# Patient Record
Sex: FEMALE | Race: WHITE | NOT HISPANIC OR LATINO | Employment: OTHER | URBAN - METROPOLITAN AREA
[De-identification: names, ages, dates, MRNs, and addresses within clinical notes are randomized per-mention and may not be internally consistent; named-entity substitution may affect disease eponyms.]

---

## 2017-12-15 ENCOUNTER — HOSPITAL ENCOUNTER (EMERGENCY)
Facility: HOSPITAL | Age: 82
Discharge: HOME | End: 2017-12-16
Attending: EMERGENCY MEDICINE | Admitting: EMERGENCY MEDICINE

## 2017-12-15 DIAGNOSIS — K59.00 CONSTIPATION, UNSPECIFIED CONSTIPATION TYPE: Primary | ICD-10-CM

## 2017-12-15 PROCEDURE — 99284 EMERGENCY DEPT VISIT MOD MDM: CPT

## 2017-12-15 RX ORDER — DOCUSATE SODIUM 100 MG/1
100 CAPSULE, LIQUID FILLED ORAL 2 TIMES DAILY PRN
COMMUNITY

## 2017-12-15 RX ORDER — SODIUM CHLORIDE 0.9 % (FLUSH) 0.9 %
10 SYRINGE (ML) INJECTION AS NEEDED
Status: DISCONTINUED | OUTPATIENT
Start: 2017-12-15 | End: 2017-12-16 | Stop reason: HOSPADM

## 2017-12-16 ENCOUNTER — APPOINTMENT (OUTPATIENT)
Dept: GENERAL RADIOLOGY | Facility: HOSPITAL | Age: 82
End: 2017-12-16

## 2017-12-16 VITALS
WEIGHT: 100 LBS | TEMPERATURE: 98 F | RESPIRATION RATE: 18 BRPM | HEIGHT: 60 IN | HEART RATE: 90 BPM | DIASTOLIC BLOOD PRESSURE: 44 MMHG | SYSTOLIC BLOOD PRESSURE: 95 MMHG | OXYGEN SATURATION: 94 % | BODY MASS INDEX: 19.63 KG/M2

## 2017-12-16 LAB
ALBUMIN SERPL-MCNC: 3.8 G/DL (ref 3.5–5.2)
ALBUMIN/GLOB SERPL: 1.4 G/DL
ALP SERPL-CCNC: 69 U/L (ref 39–117)
ALT SERPL W P-5'-P-CCNC: 11 U/L (ref 1–33)
ANION GAP SERPL CALCULATED.3IONS-SCNC: 10 MMOL/L
AST SERPL-CCNC: 13 U/L (ref 1–32)
BASOPHILS # BLD AUTO: 0.01 10*3/MM3 (ref 0–0.2)
BASOPHILS NFR BLD AUTO: 0.1 % (ref 0–1.5)
BILIRUB SERPL-MCNC: 0.3 MG/DL (ref 0.1–1.2)
BILIRUB UR QL STRIP: NEGATIVE
BUN BLD-MCNC: 19 MG/DL (ref 8–23)
BUN/CREAT SERPL: 21.1 (ref 7–25)
CALCIUM SPEC-SCNC: 9.8 MG/DL (ref 8.6–10.5)
CHLORIDE SERPL-SCNC: 100 MMOL/L (ref 98–107)
CLARITY UR: CLEAR
CO2 SERPL-SCNC: 34 MMOL/L (ref 22–29)
COLOR UR: ABNORMAL
CREAT BLD-MCNC: 0.9 MG/DL (ref 0.57–1)
DEPRECATED RDW RBC AUTO: 46.9 FL (ref 37–54)
EOSINOPHIL # BLD AUTO: 0.01 10*3/MM3 (ref 0–0.7)
EOSINOPHIL NFR BLD AUTO: 0.1 % (ref 0.3–6.2)
ERYTHROCYTE [DISTWIDTH] IN BLOOD BY AUTOMATED COUNT: 13 % (ref 11.7–13)
GFR SERPL CREATININE-BSD FRML MDRD: 60 ML/MIN/1.73
GLOBULIN UR ELPH-MCNC: 2.7 GM/DL
GLUCOSE BLD-MCNC: 122 MG/DL (ref 65–99)
GLUCOSE UR STRIP-MCNC: NEGATIVE MG/DL
HCT VFR BLD AUTO: 42.3 % (ref 35.6–45.5)
HGB BLD-MCNC: 13.5 G/DL (ref 11.9–15.5)
HGB UR QL STRIP.AUTO: NEGATIVE
IMM GRANULOCYTES # BLD: 0.04 10*3/MM3 (ref 0–0.03)
IMM GRANULOCYTES NFR BLD: 0.3 % (ref 0–0.5)
KETONES UR QL STRIP: NEGATIVE
LEUKOCYTE ESTERASE UR QL STRIP.AUTO: NEGATIVE
LIPASE SERPL-CCNC: 23 U/L (ref 13–60)
LYMPHOCYTES # BLD AUTO: 0.86 10*3/MM3 (ref 0.9–4.8)
LYMPHOCYTES NFR BLD AUTO: 7.5 % (ref 19.6–45.3)
MCH RBC QN AUTO: 31.5 PG (ref 26.9–32)
MCHC RBC AUTO-ENTMCNC: 31.9 G/DL (ref 32.4–36.3)
MCV RBC AUTO: 98.8 FL (ref 80.5–98.2)
MONOCYTES # BLD AUTO: 0.49 10*3/MM3 (ref 0.2–1.2)
MONOCYTES NFR BLD AUTO: 4.3 % (ref 5–12)
NEUTROPHILS # BLD AUTO: 10.02 10*3/MM3 (ref 1.9–8.1)
NEUTROPHILS NFR BLD AUTO: 87.7 % (ref 42.7–76)
NITRITE UR QL STRIP: NEGATIVE
PH UR STRIP.AUTO: 5.5 [PH] (ref 5–8)
PLATELET # BLD AUTO: 215 10*3/MM3 (ref 140–500)
PMV BLD AUTO: 10.3 FL (ref 6–12)
POTASSIUM BLD-SCNC: 3.7 MMOL/L (ref 3.5–5.2)
PROT SERPL-MCNC: 6.5 G/DL (ref 6–8.5)
PROT UR QL STRIP: NEGATIVE
RBC # BLD AUTO: 4.28 10*6/MM3 (ref 3.9–5.2)
SODIUM BLD-SCNC: 144 MMOL/L (ref 136–145)
SP GR UR STRIP: 1.02 (ref 1–1.03)
UROBILINOGEN UR QL STRIP: ABNORMAL
WBC NRBC COR # BLD: 11.43 10*3/MM3 (ref 4.5–10.7)

## 2017-12-16 PROCEDURE — 83690 ASSAY OF LIPASE: CPT | Performed by: PHYSICIAN ASSISTANT

## 2017-12-16 PROCEDURE — 81003 URINALYSIS AUTO W/O SCOPE: CPT | Performed by: PHYSICIAN ASSISTANT

## 2017-12-16 PROCEDURE — 85025 COMPLETE CBC W/AUTO DIFF WBC: CPT | Performed by: PHYSICIAN ASSISTANT

## 2017-12-16 PROCEDURE — 80053 COMPREHEN METABOLIC PANEL: CPT | Performed by: PHYSICIAN ASSISTANT

## 2017-12-16 PROCEDURE — 74022 RADEX COMPL AQT ABD SERIES: CPT

## 2017-12-16 RX ORDER — POLYETHYLENE GLYCOL 3350 17 G/17G
17 POWDER, FOR SOLUTION ORAL DAILY
Qty: 10 PACKET | Refills: 0 | Status: SHIPPED | OUTPATIENT
Start: 2017-12-16 | End: 2018-01-01

## 2017-12-16 RX ADMIN — SODIUM CHLORIDE 500 ML: 9 INJECTION, SOLUTION INTRAVENOUS at 01:14

## 2017-12-16 NOTE — ED NOTES
Reassurance given; call light in reach. Pts breathing even and unlabored. Pt appears in NAD at this time. Family at bedside.        Katie Denson RN  12/16/17 8567

## 2017-12-16 NOTE — DISCHARGE INSTRUCTIONS
Home, rest, home medicine as prescribed, keep well hydrated, follow up with PCP for recheck.  Return to care with further concerns.

## 2017-12-16 NOTE — ED PROVIDER NOTES
EMERGENCY DEPARTMENT ENCOUNTER    CHIEF COMPLAINT  Chief Complaint: Constipation  History given by: Patient  History limited by:   Room Number: 06/06  PMD: Héctor Abbott MD      HPI:  Pt is a 82 y.o. female who presents complaining of constipation. Pt has a hx of chronic constipation. Today, pt reported that she was having abd pain and nausea/vomiting. EMS was called and upon EMS arrival, pt had finished having a BM. Pt also has a hx of diverticulitis.    Duration: ongoing  Onset: gradual  Timing: constant  Quality: constipation  Intensity/Severity: moderate  Progression: improved  Associated Symptoms: abd pain, nausea, vomiting  Aggravating Factors: none  Alleviating Factors: BM  Previous Episodes: hx of chronic constipation  Treatment before arrival: none    PAST MEDICAL HISTORY  Active Ambulatory Problems     Diagnosis Date Noted   • Pancolitis 08/24/2016   • COPD (chronic obstructive pulmonary disease) 08/25/2016   • Hypertension 08/25/2016   • Sepsis 08/25/2016   • Acute bacterial colitis 08/26/2016     Resolved Ambulatory Problems     Diagnosis Date Noted   • No Resolved Ambulatory Problems     Past Medical History:   Diagnosis Date   • Anxiety    • Arthritis    • Cancer    • COPD (chronic obstructive pulmonary disease)    • Diverticulitis    • Hyperlipidemia    • Hypertension    • Osteoporosis        PAST SURGICAL HISTORY  Past Surgical History:   Procedure Laterality Date   • APPENDECTOMY     • BLADDER SURGERY      dr smith.   for bladder cancer   • COLON SURGERY      dr huang.  diverticulosis.   • HEMORRHOIDECTOMY      dr huang   • HYSTERECTOMY     • TONSILLECTOMY         FAMILY HISTORY  History reviewed. No pertinent family history.    SOCIAL HISTORY  Social History     Social History   • Marital status:      Spouse name: N/A   • Number of children: N/A   • Years of education: N/A     Occupational History   • Not on file.     Social History Main Topics   • Smoking status: Current Every  Day Smoker     Packs/day: 0.50     Years: 61.00     Types: Cigarettes   • Smokeless tobacco: Not on file   • Alcohol use No   • Drug use: No   • Sexual activity: Defer     Other Topics Concern   • Not on file     Social History Narrative       ALLERGIES  Adhesive tape and Latex    REVIEW OF SYSTEMS  Review of Systems   Constitutional: Negative for fever.   HENT: Negative for sore throat.    Eyes: Negative.    Respiratory: Negative for cough and shortness of breath.    Cardiovascular: Negative for chest pain.   Gastrointestinal: Positive for abdominal pain, constipation, nausea and vomiting. Negative for diarrhea.   Genitourinary: Negative for dysuria.   Musculoskeletal: Negative for neck pain.   Skin: Negative for rash.   Allergic/Immunologic: Negative.    Neurological: Negative for weakness, numbness and headaches.   Hematological: Negative.    Psychiatric/Behavioral: Negative.    All other systems reviewed and are negative.      PHYSICAL EXAM  ED Triage Vitals   Temp Heart Rate Resp BP SpO2   12/15/17 2220 12/15/17 2219 12/15/17 2219 12/15/17 2219 12/15/17 2219   97.3 °F (36.3 °C) 92 18 124/55 98 %      Temp src Heart Rate Source Patient Position BP Location FiO2 (%)   -- -- -- -- --              Physical Exam   Constitutional: She is oriented to person, place, and time and well-developed, well-nourished, and in no distress. No distress.   HENT:   Head: Normocephalic and atraumatic.   Eyes: EOM are normal. Pupils are equal, round, and reactive to light.   Neck: Normal range of motion. Neck supple.   Cardiovascular: Normal rate, regular rhythm and normal heart sounds.    Pulmonary/Chest: Effort normal and breath sounds normal. No respiratory distress.   Abdominal: Soft. Bowel sounds are normal. There is no tenderness. There is no rebound and no guarding.   Musculoskeletal: Normal range of motion. She exhibits no edema.   Neurological: She is alert and oriented to person, place, and time. She has normal sensation  and normal strength.   Skin: Skin is warm and dry. No rash noted.   Psychiatric: Mood and affect normal.   Nursing note and vitals reviewed.      LAB RESULTS  Lab Results (last 24 hours)     Procedure Component Value Units Date/Time    CBC & Differential [[id number]] Collected:  12/16/17 0112    Specimen:  Blood Updated:  12/16/17 0125    Narrative:       The following orders were created for panel order CBC & Differential.  Procedure                               Abnormality         Status                     ---------                               -----------         ------                     CBC Auto Differential[[id number]]        Abnormal            Final result                 Please view results for these tests on the individual orders.    Comprehensive Metabolic Panel [[id number]]  (Abnormal) Collected:  12/16/17 0112    Specimen:  Blood Updated:  12/16/17 0139     Glucose 122 (H) mg/dL      BUN 19 mg/dL      Creatinine 0.90 mg/dL      Sodium 144 mmol/L      Potassium 3.7 mmol/L      Chloride 100 mmol/L      CO2 34.0 (H) mmol/L      Calcium 9.8 mg/dL      Total Protein 6.5 g/dL      Albumin 3.80 g/dL      ALT (SGPT) 11 U/L      AST (SGOT) 13 U/L      Alkaline Phosphatase 69 U/L      Total Bilirubin 0.3 mg/dL      eGFR Non African Amer 60 (L) mL/min/1.73      Globulin 2.7 gm/dL      A/G Ratio 1.4 g/dL      BUN/Creatinine Ratio 21.1     Anion Gap 10.0 mmol/L     Narrative:       The MDRD GFR formula is only valid for adults with stable renal function between ages 18 and 70.    Lipase [[id number]]  (Normal) Collected:  12/16/17 0112    Specimen:  Blood Updated:  12/16/17 0139     Lipase 23 U/L     CBC Auto Differential [[id number]]  (Abnormal) Collected:  12/16/17 0112    Specimen:  Blood Updated:  12/16/17 0125     WBC 11.43 (H) 10*3/mm3      RBC 4.28 10*6/mm3      Hemoglobin 13.5 g/dL      Hematocrit 42.3 %      MCV 98.8 (H) fL      MCH 31.5 pg      MCHC 31.9 (L) g/dL      RDW 13.0 %      RDW-SD 46.9 fl      MPV  10.3 fL      Platelets 215 10*3/mm3      Neutrophil % 87.7 (H) %      Lymphocyte % 7.5 (L) %      Monocyte % 4.3 (L) %      Eosinophil % 0.1 (L) %      Basophil % 0.1 %      Immature Grans % 0.3 %      Neutrophils, Absolute 10.02 (H) 10*3/mm3      Lymphocytes, Absolute 0.86 (L) 10*3/mm3      Monocytes, Absolute 0.49 10*3/mm3      Eosinophils, Absolute 0.01 10*3/mm3      Basophils, Absolute 0.01 10*3/mm3      Immature Grans, Absolute 0.04 (H) 10*3/mm3     Urinalysis With / Culture If Indicated - Urine, Catheter [[id number]]  (Abnormal) Collected:  12/16/17 0123    Specimen:  Urine from Urine, Catheter Updated:  12/16/17 0150     Color, UA Dark Yellow (A)     Appearance, UA Clear     pH, UA 5.5     Specific Gravity, UA 1.020     Glucose, UA Negative     Ketones, UA Negative     Bilirubin, UA Negative     Blood, UA Negative     Protein, UA Negative     Leuk Esterase, UA Negative     Nitrite, UA Negative     Urobilinogen, UA 0.2 E.U./dL    Narrative:       Urine microscopic not indicated.          I ordered the above labs and reviewed the results    RADIOLOGY  XR Abdomen 2 View With Chest 1 View   Final Result   1. Nonspecific, nonobstructive bowel gas pattern.       This report was finalized on 12/16/2017 12:57 AM by Sherman Vera MD.               I ordered the above noted radiological studies. Interpreted by radiologist. Reviewed by me in PACS.       PROCEDURES  Procedures      PROGRESS AND CONSULTS  ED Course   11:10 PM  Ordered UA, blood work, and X-ray abd. IVF ordered for hydration.  12:09 AM  Reviewed pt's history and workup with Dr. Galindo.  After a bedside evaluation; Dr Galindo agrees with the plan of care  2:08 AM  Rechecked with pt. Informed pt of her negative labs and imaging. Discussed with pt about plan to discharge. Pt understands and agrees with plan. All concerns addressed.         MEDICAL DECISION MAKING  Results were reviewed/discussed with the patient and they were also made aware of online access.  Pt also made aware that some labs, such as cultures, will not be resulted during ER visit and follow up with PMD is necessary.     MDM  Number of Diagnoses or Management Options  Constipation, unspecified constipation type:      Amount and/or Complexity of Data Reviewed  Clinical lab tests: reviewed and ordered  Tests in the radiology section of CPT®: reviewed and ordered           DIAGNOSIS  Final diagnoses:   Constipation, unspecified constipation type       DISPOSITION  DISCHARGE    Patient discharged in stable condition.    Reviewed implications of results, diagnosis, meds, responsibility to follow up, warning signs and symptoms of possible worsening, potential complications and reasons to return to ER.    Patient/Family voiced understanding of above instructions.    Discussed plan for discharge, as there is no emergent indication for admission.  Pt/family is agreeable and understands need for follow up and repeat testing.  Pt is aware that discharge does not mean that nothing is wrong but it indicates no emergency is present that requires admission and they must continue care with follow-up as given below or physician of their choice.     FOLLOW-UP  Héctor Abbott MD  [address]  [address]  [place] [id number] [phone]    Schedule an appointment as soon as possible for a visit in 4 days           Medication List      New Prescriptions          polyethylene glycol packet   Commonly known as:  MIRALAX   Take 17 g by mouth Daily.         Stop          lactobacillus acidophilus capsule capsule       MELOXICAM PO       simvastatin 10 MG tablet   Commonly known as:  ZOCOR       TRAMADOL HCL ER PO               Latest Documented Vital Signs:  As of 2:46 AM  BP- 95/44 HR- 90 Temp- 98 °F (36.7 °C) (Tympanic) O2 sat- 94%    --  Documentation assistance provided by cooper Matamoros for Jose L Ocampo PA-C.  Information recorded by the cooper was done at my direction and has been verified and validated by  me.     Miguel Ángel Matamoros  12/16/17 0053       Miguel Ángel Matamoros  12/16/17 0210       YANCY Devine  12/16/17 0247

## 2017-12-16 NOTE — ED PROVIDER NOTES
Discussed pt's case with Terrence MENDEZ.   PT presents to ER with family present at bedside. Family states that pt has been constipated. Family also c/o generalized weakness, mildly decreased appetite, and recent weight loss. Family states that pt does take magnesium citrate for relief. discussed plan to perform  labs to see if pt is dehydrated. Discussed the possibility of placing pt on MiraLax. Recommend pt take B vitamin supplements to help stimulate pt's appetite.   On exam, pt is initially sleeping, but wakes easily to physical stimuli. Pt is resting comfortably in bed and in no acute distress. Abdomen is benign, no localized tenderness. There is no guarding/ rebound/ or mass.    I supervised care provided by the midlevel provider.    We have discussed this patient's history, physical exam, and treatment plan.   I have reviewed the note and personally saw and examined the patient and agree with the plan of care.    Documentation assistance provided by cooper Vergara for Dr. Galindo.  Information recorded by the scribe was done at my direction and has been verified and validated by me.       Scooby Vergara  12/16/17 0010       Vinicius Galindo MD  12/16/17 0601

## 2017-12-20 ENCOUNTER — TELEPHONE (OUTPATIENT)
Dept: SOCIAL WORK | Facility: HOSPITAL | Age: 82
End: 2017-12-20

## 2018-01-01 ENCOUNTER — APPOINTMENT (OUTPATIENT)
Dept: GENERAL RADIOLOGY | Facility: HOSPITAL | Age: 83
End: 2018-01-01

## 2018-01-01 ENCOUNTER — APPOINTMENT (OUTPATIENT)
Dept: CT IMAGING | Facility: HOSPITAL | Age: 83
End: 2018-01-01

## 2018-01-01 ENCOUNTER — HOSPITAL ENCOUNTER (INPATIENT)
Facility: HOSPITAL | Age: 83
LOS: 3 days | Discharge: NURSING, REHAB OR LONG-TERM CARE | End: 2018-07-13
Attending: EMERGENCY MEDICINE | Admitting: INTERNAL MEDICINE

## 2018-01-01 ENCOUNTER — HOSPITAL ENCOUNTER (OUTPATIENT)
Facility: HOSPITAL | Age: 83
Setting detail: OBSERVATION
Discharge: HOME | End: 2018-04-28
Attending: EMERGENCY MEDICINE | Admitting: INTERNAL MEDICINE

## 2018-01-01 VITALS
HEIGHT: 60 IN | TEMPERATURE: 97.9 F | HEART RATE: 81 BPM | DIASTOLIC BLOOD PRESSURE: 60 MMHG | OXYGEN SATURATION: 95 % | BODY MASS INDEX: 20.75 KG/M2 | SYSTOLIC BLOOD PRESSURE: 109 MMHG | WEIGHT: 105.7 LBS | RESPIRATION RATE: 20 BRPM

## 2018-01-01 VITALS
BODY MASS INDEX: 20.93 KG/M2 | HEART RATE: 80 BPM | HEIGHT: 60 IN | WEIGHT: 106.6 LBS | DIASTOLIC BLOOD PRESSURE: 78 MMHG | RESPIRATION RATE: 20 BRPM | SYSTOLIC BLOOD PRESSURE: 143 MMHG | OXYGEN SATURATION: 100 % | TEMPERATURE: 97.8 F

## 2018-01-01 DIAGNOSIS — R53.1 GENERALIZED WEAKNESS: ICD-10-CM

## 2018-01-01 DIAGNOSIS — J44.1 COPD EXACERBATION (HCC): Primary | ICD-10-CM
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
## 2018-01-01 DIAGNOSIS — S22.32XA CLOSED FRACTURE OF ONE RIB OF LEFT SIDE, INITIAL ENCOUNTER: ICD-10-CM

## 2018-01-01 DIAGNOSIS — L02.91 ABSCESS: ICD-10-CM

## 2018-01-01 DIAGNOSIS — K57.92 ACUTE DIVERTICULITIS: ICD-10-CM

## 2018-01-01 DIAGNOSIS — I95.9 HYPOTENSION, UNSPECIFIED HYPOTENSION TYPE: ICD-10-CM

## 2018-01-01 LAB
ALBUMIN SERPL-MCNC: 3.5 G/DL (ref 3.5–5.2)
ALBUMIN SERPL-MCNC: 3.7 G/DL (ref 3.5–5.2)
ALBUMIN SERPL-MCNC: 3.9 G/DL (ref 3.5–5.2)
ALBUMIN/GLOB SERPL: 1.4 G/DL
ALBUMIN/GLOB SERPL: 1.5 G/DL
ALBUMIN/GLOB SERPL: 1.5 G/DL
ALP SERPL-CCNC: 72 U/L (ref 39–117)
ALP SERPL-CCNC: 74 U/L (ref 39–117)
ALP SERPL-CCNC: 81 U/L (ref 39–117)
ALT SERPL W P-5'-P-CCNC: 6 U/L (ref 1–33)
ALT SERPL W P-5'-P-CCNC: 8 U/L (ref 1–33)
ALT SERPL W P-5'-P-CCNC: 8 U/L (ref 1–33)
ANION GAP SERPL CALCULATED.3IONS-SCNC: 10.4 MMOL/L
ANION GAP SERPL CALCULATED.3IONS-SCNC: 11.1 MMOL/L
ANION GAP SERPL CALCULATED.3IONS-SCNC: 12.1 MMOL/L
ANION GAP SERPL CALCULATED.3IONS-SCNC: 12.2 MMOL/L
ANION GAP SERPL CALCULATED.3IONS-SCNC: 8 MMOL/L
ANION GAP SERPL CALCULATED.3IONS-SCNC: 8.1 MMOL/L
ARTERIAL PATENCY WRIST A: ABNORMAL
ARTERIAL PATENCY WRIST A: ABNORMAL
AST SERPL-CCNC: 10 U/L (ref 1–32)
AST SERPL-CCNC: 12 U/L (ref 1–32)
AST SERPL-CCNC: 14 U/L (ref 1–32)
ATMOSPHERIC PRESS: 750.6 MMHG
ATMOSPHERIC PRESS: 752 MMHG
B PERT DNA SPEC QL NAA+PROBE: NOT DETECTED
BACTERIA UR QL AUTO: ABNORMAL /HPF
BASE EXCESS BLDA CALC-SCNC: 10.2 MMOL/L (ref 0–2)
BASE EXCESS BLDA CALC-SCNC: 5.8 MMOL/L (ref 0–2)
BASOPHILS # BLD AUTO: 0 10*3/MM3 (ref 0–0.2)
BASOPHILS # BLD AUTO: 0.01 10*3/MM3 (ref 0–0.2)
BASOPHILS # BLD AUTO: 0.01 10*3/MM3 (ref 0–0.2)
BASOPHILS # BLD AUTO: 0.02 10*3/MM3 (ref 0–0.2)
BASOPHILS NFR BLD AUTO: 0 % (ref 0–1.5)
BASOPHILS NFR BLD AUTO: 0.1 % (ref 0–1.5)
BDY SITE: ABNORMAL
BDY SITE: ABNORMAL
BILIRUB SERPL-MCNC: 0.3 MG/DL (ref 0.1–1.2)
BILIRUB SERPL-MCNC: 0.5 MG/DL (ref 0.1–1.2)
BILIRUB SERPL-MCNC: 0.6 MG/DL (ref 0.1–1.2)
BILIRUB UR QL STRIP: NEGATIVE
BUN BLD-MCNC: 12 MG/DL (ref 8–23)
BUN BLD-MCNC: 15 MG/DL (ref 8–23)
BUN BLD-MCNC: 16 MG/DL (ref 8–23)
BUN BLD-MCNC: 18 MG/DL (ref 8–23)
BUN BLD-MCNC: 18 MG/DL (ref 8–23)
BUN BLD-MCNC: 21 MG/DL (ref 8–23)
BUN/CREAT SERPL: 19.7 (ref 7–25)
BUN/CREAT SERPL: 20.3 (ref 7–25)
BUN/CREAT SERPL: 21.4 (ref 7–25)
BUN/CREAT SERPL: 26.9 (ref 7–25)
BUN/CREAT SERPL: 26.9 (ref 7–25)
BUN/CREAT SERPL: 28.4 (ref 7–25)
C PNEUM DNA NPH QL NAA+NON-PROBE: NOT DETECTED
CALCIUM SPEC-SCNC: 10 MG/DL (ref 8.6–10.5)
CALCIUM SPEC-SCNC: 8.1 MG/DL (ref 8.6–10.5)
CALCIUM SPEC-SCNC: 8.3 MG/DL (ref 8.6–10.5)
CALCIUM SPEC-SCNC: 8.4 MG/DL (ref 8.6–10.5)
CALCIUM SPEC-SCNC: 9.4 MG/DL (ref 8.6–10.5)
CALCIUM SPEC-SCNC: 9.8 MG/DL (ref 8.6–10.5)
CHLORIDE SERPL-SCNC: 100 MMOL/L (ref 98–107)
CHLORIDE SERPL-SCNC: 101 MMOL/L (ref 98–107)
CHLORIDE SERPL-SCNC: 104 MMOL/L (ref 98–107)
CHLORIDE SERPL-SCNC: 104 MMOL/L (ref 98–107)
CHLORIDE SERPL-SCNC: 110 MMOL/L (ref 98–107)
CHLORIDE SERPL-SCNC: 111 MMOL/L (ref 98–107)
CLARITY UR: CLEAR
CO2 SERPL-SCNC: 21.9 MMOL/L (ref 22–29)
CO2 SERPL-SCNC: 24.9 MMOL/L (ref 22–29)
CO2 SERPL-SCNC: 25.8 MMOL/L (ref 22–29)
CO2 SERPL-SCNC: 29.6 MMOL/L (ref 22–29)
CO2 SERPL-SCNC: 31.9 MMOL/L (ref 22–29)
CO2 SERPL-SCNC: 36 MMOL/L (ref 22–29)
COLOR UR: YELLOW
CREAT BLD-MCNC: 0.56 MG/DL (ref 0.57–1)
CREAT BLD-MCNC: 0.67 MG/DL (ref 0.57–1)
CREAT BLD-MCNC: 0.67 MG/DL (ref 0.57–1)
CREAT BLD-MCNC: 0.74 MG/DL (ref 0.57–1)
CREAT BLD-MCNC: 0.76 MG/DL (ref 0.57–1)
CREAT BLD-MCNC: 0.79 MG/DL (ref 0.57–1)
DEPRECATED RDW RBC AUTO: 43.3 FL (ref 37–54)
DEPRECATED RDW RBC AUTO: 45 FL (ref 37–54)
DEPRECATED RDW RBC AUTO: 45.5 FL (ref 37–54)
DEPRECATED RDW RBC AUTO: 45.5 FL (ref 37–54)
DEPRECATED RDW RBC AUTO: 46.2 FL (ref 37–54)
DEPRECATED RDW RBC AUTO: 46.3 FL (ref 37–54)
EOSINOPHIL # BLD AUTO: 0 10*3/MM3 (ref 0–0.7)
EOSINOPHIL # BLD AUTO: 0.05 10*3/MM3 (ref 0–0.7)
EOSINOPHIL # BLD AUTO: 0.06 10*3/MM3 (ref 0–0.7)
EOSINOPHIL # BLD AUTO: 0.19 10*3/MM3 (ref 0–0.7)
EOSINOPHIL NFR BLD AUTO: 0 % (ref 0.3–6.2)
EOSINOPHIL NFR BLD AUTO: 0.4 % (ref 0.3–6.2)
EOSINOPHIL NFR BLD AUTO: 0.6 % (ref 0.3–6.2)
EOSINOPHIL NFR BLD AUTO: 2.3 % (ref 0.3–6.2)
ERYTHROCYTE [DISTWIDTH] IN BLOOD BY AUTOMATED COUNT: 12.2 % (ref 11.7–13)
ERYTHROCYTE [DISTWIDTH] IN BLOOD BY AUTOMATED COUNT: 12.5 % (ref 11.7–13)
ERYTHROCYTE [DISTWIDTH] IN BLOOD BY AUTOMATED COUNT: 12.5 % (ref 11.7–13)
ERYTHROCYTE [DISTWIDTH] IN BLOOD BY AUTOMATED COUNT: 12.7 % (ref 11.7–13)
ERYTHROCYTE [DISTWIDTH] IN BLOOD BY AUTOMATED COUNT: 12.9 % (ref 11.7–13)
ERYTHROCYTE [DISTWIDTH] IN BLOOD BY AUTOMATED COUNT: 13 % (ref 11.7–13)
FLUAV H1 2009 PAND RNA NPH QL NAA+PROBE: NOT DETECTED
FLUAV H1 HA GENE NPH QL NAA+PROBE: NOT DETECTED
FLUAV H3 RNA NPH QL NAA+PROBE: NOT DETECTED
FLUAV SUBTYP SPEC NAA+PROBE: NOT DETECTED
FLUBV RNA ISLT QL NAA+PROBE: NOT DETECTED
GAS FLOW AIRWAY: 2 LPM
GAS FLOW AIRWAY: 2 LPM
GFR SERPL CREATININE-BSD FRML MDRD: 104 ML/MIN/1.73
GFR SERPL CREATININE-BSD FRML MDRD: 70 ML/MIN/1.73
GFR SERPL CREATININE-BSD FRML MDRD: 73 ML/MIN/1.73
GFR SERPL CREATININE-BSD FRML MDRD: 75 ML/MIN/1.73
GFR SERPL CREATININE-BSD FRML MDRD: 84 ML/MIN/1.73
GFR SERPL CREATININE-BSD FRML MDRD: 84 ML/MIN/1.73
GLOBULIN UR ELPH-MCNC: 2.4 GM/DL
GLOBULIN UR ELPH-MCNC: 2.4 GM/DL
GLOBULIN UR ELPH-MCNC: 2.7 GM/DL
GLUCOSE BLD-MCNC: 104 MG/DL (ref 65–99)
GLUCOSE BLD-MCNC: 108 MG/DL (ref 65–99)
GLUCOSE BLD-MCNC: 138 MG/DL (ref 65–99)
GLUCOSE BLD-MCNC: 166 MG/DL (ref 65–99)
GLUCOSE BLD-MCNC: 87 MG/DL (ref 65–99)
GLUCOSE BLD-MCNC: 87 MG/DL (ref 65–99)
GLUCOSE UR STRIP-MCNC: NEGATIVE MG/DL
HADV DNA SPEC NAA+PROBE: NOT DETECTED
HCO3 BLDA-SCNC: 31 MMOL/L (ref 22–28)
HCO3 BLDA-SCNC: 37 MMOL/L (ref 22–28)
HCOV 229E RNA SPEC QL NAA+PROBE: NOT DETECTED
HCOV HKU1 RNA SPEC QL NAA+PROBE: NOT DETECTED
HCOV NL63 RNA SPEC QL NAA+PROBE: NOT DETECTED
HCOV OC43 RNA SPEC QL NAA+PROBE: NOT DETECTED
HCT VFR BLD AUTO: 33.4 % (ref 35.6–45.5)
HCT VFR BLD AUTO: 33.7 % (ref 35.6–45.5)
HCT VFR BLD AUTO: 34.4 % (ref 35.6–45.5)
HCT VFR BLD AUTO: 37.7 % (ref 35.6–45.5)
HCT VFR BLD AUTO: 40.1 % (ref 35.6–45.5)
HCT VFR BLD AUTO: 42.6 % (ref 35.6–45.5)
HGB BLD-MCNC: 10.5 G/DL (ref 11.9–15.5)
HGB BLD-MCNC: 10.9 G/DL (ref 11.9–15.5)
HGB BLD-MCNC: 11 G/DL (ref 11.9–15.5)
HGB BLD-MCNC: 12 G/DL (ref 11.9–15.5)
HGB BLD-MCNC: 12.7 G/DL (ref 11.9–15.5)
HGB BLD-MCNC: 13.5 G/DL (ref 11.9–15.5)
HGB UR QL STRIP.AUTO: NEGATIVE
HMPV RNA NPH QL NAA+NON-PROBE: NOT DETECTED
HOLD SPECIMEN: NORMAL
HOLD SPECIMEN: NORMAL
HPIV1 RNA SPEC QL NAA+PROBE: NOT DETECTED
HPIV2 RNA SPEC QL NAA+PROBE: NOT DETECTED
HPIV3 RNA NPH QL NAA+PROBE: NOT DETECTED
HPIV4 P GENE NPH QL NAA+PROBE: NOT DETECTED
HYALINE CASTS UR QL AUTO: ABNORMAL /LPF
IMM GRANULOCYTES # BLD: 0 10*3/MM3 (ref 0–0.03)
IMM GRANULOCYTES # BLD: 0 10*3/MM3 (ref 0–0.03)
IMM GRANULOCYTES # BLD: 0.01 10*3/MM3 (ref 0–0.03)
IMM GRANULOCYTES # BLD: 0.03 10*3/MM3 (ref 0–0.03)
IMM GRANULOCYTES # BLD: 0.03 10*3/MM3 (ref 0–0.03)
IMM GRANULOCYTES # BLD: 0.04 10*3/MM3 (ref 0–0.03)
IMM GRANULOCYTES NFR BLD: 0 % (ref 0–0.5)
IMM GRANULOCYTES NFR BLD: 0 % (ref 0–0.5)
IMM GRANULOCYTES NFR BLD: 0.1 % (ref 0–0.5)
IMM GRANULOCYTES NFR BLD: 0.2 % (ref 0–0.5)
IMM GRANULOCYTES NFR BLD: 0.3 % (ref 0–0.5)
IMM GRANULOCYTES NFR BLD: 0.3 % (ref 0–0.5)
INR PPP: 0.95 (ref 0.9–1.1)
KETONES UR QL STRIP: NEGATIVE
LEUKOCYTE ESTERASE UR QL STRIP.AUTO: ABNORMAL
LIPASE SERPL-CCNC: 14 U/L (ref 13–60)
LYMPHOCYTES # BLD AUTO: 0.36 10*3/MM3 (ref 0.9–4.8)
LYMPHOCYTES # BLD AUTO: 0.51 10*3/MM3 (ref 0.9–4.8)
LYMPHOCYTES # BLD AUTO: 1.03 10*3/MM3 (ref 0.9–4.8)
LYMPHOCYTES # BLD AUTO: 1.19 10*3/MM3 (ref 0.9–4.8)
LYMPHOCYTES # BLD AUTO: 1.2 10*3/MM3 (ref 0.9–4.8)
LYMPHOCYTES # BLD AUTO: 1.59 10*3/MM3 (ref 0.9–4.8)
LYMPHOCYTES NFR BLD AUTO: 14.7 % (ref 19.6–45.3)
LYMPHOCYTES NFR BLD AUTO: 20.4 % (ref 19.6–45.3)
LYMPHOCYTES NFR BLD AUTO: 4.6 % (ref 19.6–45.3)
LYMPHOCYTES NFR BLD AUTO: 6.9 % (ref 19.6–45.3)
LYMPHOCYTES NFR BLD AUTO: 8.4 % (ref 19.6–45.3)
LYMPHOCYTES NFR BLD AUTO: 8.7 % (ref 19.6–45.3)
M PNEUMO IGG SER IA-ACNC: NOT DETECTED
MAGNESIUM SERPL-MCNC: 2.1 MG/DL (ref 1.6–2.4)
MCH RBC QN AUTO: 31.2 PG (ref 26.9–32)
MCH RBC QN AUTO: 31.4 PG (ref 26.9–32)
MCH RBC QN AUTO: 31.7 PG (ref 26.9–32)
MCH RBC QN AUTO: 31.7 PG (ref 26.9–32)
MCHC RBC AUTO-ENTMCNC: 31.4 G/DL (ref 32.4–36.3)
MCHC RBC AUTO-ENTMCNC: 31.7 G/DL (ref 32.4–36.3)
MCHC RBC AUTO-ENTMCNC: 31.7 G/DL (ref 32.4–36.3)
MCHC RBC AUTO-ENTMCNC: 31.8 G/DL (ref 32.4–36.3)
MCHC RBC AUTO-ENTMCNC: 32 G/DL (ref 32.4–36.3)
MCHC RBC AUTO-ENTMCNC: 32.3 G/DL (ref 32.4–36.3)
MCV RBC AUTO: 100 FL (ref 80.5–98.2)
MCV RBC AUTO: 97.5 FL (ref 80.5–98.2)
MCV RBC AUTO: 97.9 FL (ref 80.5–98.2)
MCV RBC AUTO: 98 FL (ref 80.5–98.2)
MCV RBC AUTO: 99 FL (ref 80.5–98.2)
MCV RBC AUTO: 99.1 FL (ref 80.5–98.2)
MODALITY: ABNORMAL
MODALITY: ABNORMAL
MONOCYTES # BLD AUTO: 0.12 10*3/MM3 (ref 0.2–1.2)
MONOCYTES # BLD AUTO: 0.24 10*3/MM3 (ref 0.2–1.2)
MONOCYTES # BLD AUTO: 0.69 10*3/MM3 (ref 0.2–1.2)
MONOCYTES # BLD AUTO: 0.82 10*3/MM3 (ref 0.2–1.2)
MONOCYTES # BLD AUTO: 0.92 10*3/MM3 (ref 0.2–1.2)
MONOCYTES # BLD AUTO: 1.11 10*3/MM3 (ref 0.2–1.2)
MONOCYTES NFR BLD AUTO: 11.3 % (ref 5–12)
MONOCYTES NFR BLD AUTO: 2.2 % (ref 5–12)
MONOCYTES NFR BLD AUTO: 2.3 % (ref 5–12)
MONOCYTES NFR BLD AUTO: 6.9 % (ref 5–12)
MONOCYTES NFR BLD AUTO: 7.8 % (ref 5–12)
MONOCYTES NFR BLD AUTO: 8.9 % (ref 5–12)
NEUTROPHILS # BLD AUTO: 10.24 10*3/MM3 (ref 1.9–8.1)
NEUTROPHILS # BLD AUTO: 11.9 10*3/MM3 (ref 1.9–8.1)
NEUTROPHILS # BLD AUTO: 4.7 10*3/MM3 (ref 1.9–8.1)
NEUTROPHILS # BLD AUTO: 5.44 10*3/MM3 (ref 1.9–8.1)
NEUTROPHILS # BLD AUTO: 5.8 10*3/MM3 (ref 1.9–8.1)
NEUTROPHILS # BLD AUTO: 9.98 10*3/MM3 (ref 1.9–8.1)
NEUTROPHILS NFR BLD AUTO: 70 % (ref 42.7–76)
NEUTROPHILS NFR BLD AUTO: 71.6 % (ref 42.7–76)
NEUTROPHILS NFR BLD AUTO: 83.1 % (ref 42.7–76)
NEUTROPHILS NFR BLD AUTO: 84.1 % (ref 42.7–76)
NEUTROPHILS NFR BLD AUTO: 90.8 % (ref 42.7–76)
NEUTROPHILS NFR BLD AUTO: 93.2 % (ref 42.7–76)
NITRITE UR QL STRIP: NEGATIVE
NT-PROBNP SERPL-MCNC: 328.9 PG/ML (ref 0–1800)
PCO2 BLDA: 46.6 MM HG (ref 35–45)
PCO2 BLDA: 58.2 MM HG (ref 35–45)
PH BLDA: 7.41 PH UNITS (ref 7.35–7.45)
PH BLDA: 7.43 PH UNITS (ref 7.35–7.45)
PH UR STRIP.AUTO: 6.5 [PH] (ref 5–8)
PLATELET # BLD AUTO: 191 10*3/MM3 (ref 140–500)
PLATELET # BLD AUTO: 196 10*3/MM3 (ref 140–500)
PLATELET # BLD AUTO: 200 10*3/MM3 (ref 140–500)
PLATELET # BLD AUTO: 215 10*3/MM3 (ref 140–500)
PLATELET # BLD AUTO: 241 10*3/MM3 (ref 140–500)
PLATELET # BLD AUTO: 257 10*3/MM3 (ref 140–500)
PMV BLD AUTO: 10.5 FL (ref 6–12)
PMV BLD AUTO: 10.6 FL (ref 6–12)
PMV BLD AUTO: 10.6 FL (ref 6–12)
PMV BLD AUTO: 10.7 FL (ref 6–12)
PMV BLD AUTO: 10.8 FL (ref 6–12)
PMV BLD AUTO: 11 FL (ref 6–12)
PO2 BLDA: 136.3 MM HG (ref 80–100)
PO2 BLDA: 94.9 MM HG (ref 80–100)
POTASSIUM BLD-SCNC: 4 MMOL/L (ref 3.5–5.2)
POTASSIUM BLD-SCNC: 4.1 MMOL/L (ref 3.5–5.2)
POTASSIUM BLD-SCNC: 4.2 MMOL/L (ref 3.5–5.2)
POTASSIUM BLD-SCNC: 4.2 MMOL/L (ref 3.5–5.2)
POTASSIUM BLD-SCNC: 4.4 MMOL/L (ref 3.5–5.2)
POTASSIUM BLD-SCNC: 4.6 MMOL/L (ref 3.5–5.2)
PROCALCITONIN SERPL-MCNC: 0.03 NG/ML (ref 0.1–0.25)
PROT SERPL-MCNC: 5.9 G/DL (ref 6–8.5)
PROT SERPL-MCNC: 6.1 G/DL (ref 6–8.5)
PROT SERPL-MCNC: 6.6 G/DL (ref 6–8.5)
PROT UR QL STRIP: ABNORMAL
PROTHROMBIN TIME: 12.5 SECONDS (ref 11.7–14.2)
RBC # BLD AUTO: 3.37 10*6/MM3 (ref 3.9–5.2)
RBC # BLD AUTO: 3.44 10*6/MM3 (ref 3.9–5.2)
RBC # BLD AUTO: 3.53 10*6/MM3 (ref 3.9–5.2)
RBC # BLD AUTO: 3.85 10*6/MM3 (ref 3.9–5.2)
RBC # BLD AUTO: 4.05 10*6/MM3 (ref 3.9–5.2)
RBC # BLD AUTO: 4.26 10*6/MM3 (ref 3.9–5.2)
RBC # UR: ABNORMAL /HPF
REF LAB TEST METHOD: ABNORMAL
RHINOVIRUS RNA SPEC NAA+PROBE: NOT DETECTED
RSV RNA NPH QL NAA+NON-PROBE: NOT DETECTED
SAO2 % BLDCOA: 97.5 % (ref 92–99)
SAO2 % BLDCOA: 99 % (ref 92–99)
SODIUM BLD-SCNC: 142 MMOL/L (ref 136–145)
SODIUM BLD-SCNC: 143 MMOL/L (ref 136–145)
SODIUM BLD-SCNC: 144 MMOL/L (ref 136–145)
SODIUM BLD-SCNC: 145 MMOL/L (ref 136–145)
SP GR UR STRIP: 1.02 (ref 1–1.03)
SQUAMOUS #/AREA URNS HPF: ABNORMAL /HPF
T4 FREE SERPL-MCNC: 0.9 NG/DL (ref 0.93–1.7)
TOTAL RATE: 16 BREATHS/MINUTE
TOTAL RATE: 18 BREATHS/MINUTE
TROPONIN T SERPL-MCNC: <0.01 NG/ML (ref 0–0.03)
TROPONIN T SERPL-MCNC: <0.01 NG/ML (ref 0–0.03)
TSH SERPL DL<=0.05 MIU/L-ACNC: 1.54 MIU/ML (ref 0.27–4.2)
UROBILINOGEN UR QL STRIP: ABNORMAL
WBC NRBC COR # BLD: 10.99 10*3/MM3 (ref 4.5–10.7)
WBC NRBC COR # BLD: 11.87 10*3/MM3 (ref 4.5–10.7)
WBC NRBC COR # BLD: 14.32 10*3/MM3 (ref 4.5–10.7)
WBC NRBC COR # BLD: 5.18 10*3/MM3 (ref 4.5–10.7)
WBC NRBC COR # BLD: 7.78 10*3/MM3 (ref 4.5–10.7)
WBC NRBC COR # BLD: 8.11 10*3/MM3 (ref 4.5–10.7)
WBC UR QL AUTO: ABNORMAL /HPF
WHOLE BLOOD HOLD SPECIMEN: NORMAL
WHOLE BLOOD HOLD SPECIMEN: NORMAL
YEAST URNS QL MICRO: ABNORMAL /HPF

## 2018-01-01 PROCEDURE — 25010000002 ENOXAPARIN PER 10 MG: Performed by: INTERNAL MEDICINE

## 2018-01-01 PROCEDURE — 74177 CT ABD & PELVIS W/CONTRAST: CPT

## 2018-01-01 PROCEDURE — 25010000002 HEPARIN (PORCINE) PER 1000 UNITS: Performed by: INTERNAL MEDICINE

## 2018-01-01 PROCEDURE — 94799 UNLISTED PULMONARY SVC/PX: CPT

## 2018-01-01 PROCEDURE — 87633 RESP VIRUS 12-25 TARGETS: CPT | Performed by: INTERNAL MEDICINE

## 2018-01-01 PROCEDURE — 84443 ASSAY THYROID STIM HORMONE: CPT | Performed by: EMERGENCY MEDICINE

## 2018-01-01 PROCEDURE — 96376 TX/PRO/DX INJ SAME DRUG ADON: CPT

## 2018-01-01 PROCEDURE — 93005 ELECTROCARDIOGRAM TRACING: CPT | Performed by: EMERGENCY MEDICINE

## 2018-01-01 PROCEDURE — 82803 BLOOD GASES ANY COMBINATION: CPT

## 2018-01-01 PROCEDURE — G0378 HOSPITAL OBSERVATION PER HR: HCPCS

## 2018-01-01 PROCEDURE — 97530 THERAPEUTIC ACTIVITIES: CPT

## 2018-01-01 PROCEDURE — 36600 WITHDRAWAL OF ARTERIAL BLOOD: CPT

## 2018-01-01 PROCEDURE — 80053 COMPREHEN METABOLIC PANEL: CPT | Performed by: INTERNAL MEDICINE

## 2018-01-01 PROCEDURE — 97165 OT EVAL LOW COMPLEX 30 MIN: CPT

## 2018-01-01 PROCEDURE — 93010 ELECTROCARDIOGRAM REPORT: CPT | Performed by: INTERNAL MEDICINE

## 2018-01-01 PROCEDURE — 94640 AIRWAY INHALATION TREATMENT: CPT

## 2018-01-01 PROCEDURE — 97162 PT EVAL MOD COMPLEX 30 MIN: CPT

## 2018-01-01 PROCEDURE — 99221 1ST HOSP IP/OBS SF/LOW 40: CPT | Performed by: PSYCHIATRY & NEUROLOGY

## 2018-01-01 PROCEDURE — 81001 URINALYSIS AUTO W/SCOPE: CPT | Performed by: NURSE PRACTITIONER

## 2018-01-01 PROCEDURE — 80053 COMPREHEN METABOLIC PANEL: CPT | Performed by: EMERGENCY MEDICINE

## 2018-01-01 PROCEDURE — 25010000002 METHYLPREDNISOLONE PER 125 MG: Performed by: INTERNAL MEDICINE

## 2018-01-01 PROCEDURE — 25010000002 METHYLPREDNISOLONE PER 125 MG: Performed by: EMERGENCY MEDICINE

## 2018-01-01 PROCEDURE — 87581 M.PNEUMON DNA AMP PROBE: CPT | Performed by: INTERNAL MEDICINE

## 2018-01-01 PROCEDURE — 25810000003 SODIUM CHLORIDE 0.9 % WITH KCL 20 MEQ 20-0.9 MEQ/L-% SOLUTION: Performed by: INTERNAL MEDICINE

## 2018-01-01 PROCEDURE — 96372 THER/PROPH/DIAG INJ SC/IM: CPT

## 2018-01-01 PROCEDURE — 80048 BASIC METABOLIC PNL TOTAL CA: CPT | Performed by: INTERNAL MEDICINE

## 2018-01-01 PROCEDURE — 71046 X-RAY EXAM CHEST 2 VIEWS: CPT

## 2018-01-01 PROCEDURE — 70450 CT HEAD/BRAIN W/O DYE: CPT

## 2018-01-01 PROCEDURE — 97112 NEUROMUSCULAR REEDUCATION: CPT

## 2018-01-01 PROCEDURE — 97110 THERAPEUTIC EXERCISES: CPT

## 2018-01-01 PROCEDURE — 87486 CHLMYD PNEUM DNA AMP PROBE: CPT | Performed by: INTERNAL MEDICINE

## 2018-01-01 PROCEDURE — G8978 MOBILITY CURRENT STATUS: HCPCS

## 2018-01-01 PROCEDURE — 84484 ASSAY OF TROPONIN QUANT: CPT | Performed by: NURSE PRACTITIONER

## 2018-01-01 PROCEDURE — 83735 ASSAY OF MAGNESIUM: CPT | Performed by: EMERGENCY MEDICINE

## 2018-01-01 PROCEDURE — 97535 SELF CARE MNGMENT TRAINING: CPT

## 2018-01-01 PROCEDURE — 83690 ASSAY OF LIPASE: CPT | Performed by: NURSE PRACTITIONER

## 2018-01-01 PROCEDURE — 96361 HYDRATE IV INFUSION ADD-ON: CPT

## 2018-01-01 PROCEDURE — 99222 1ST HOSP IP/OBS MODERATE 55: CPT | Performed by: COLON & RECTAL SURGERY

## 2018-01-01 PROCEDURE — 83880 ASSAY OF NATRIURETIC PEPTIDE: CPT | Performed by: EMERGENCY MEDICINE

## 2018-01-01 PROCEDURE — 85025 COMPLETE CBC W/AUTO DIFF WBC: CPT | Performed by: NURSE PRACTITIONER

## 2018-01-01 PROCEDURE — G8979 MOBILITY GOAL STATUS: HCPCS

## 2018-01-01 PROCEDURE — G8980 MOBILITY D/C STATUS: HCPCS

## 2018-01-01 PROCEDURE — 84484 ASSAY OF TROPONIN QUANT: CPT | Performed by: EMERGENCY MEDICINE

## 2018-01-01 PROCEDURE — 25010000002 LEVOFLOXACIN PER 250 MG: Performed by: INTERNAL MEDICINE

## 2018-01-01 PROCEDURE — 85610 PROTHROMBIN TIME: CPT | Performed by: EMERGENCY MEDICINE

## 2018-01-01 PROCEDURE — 85025 COMPLETE CBC W/AUTO DIFF WBC: CPT | Performed by: INTERNAL MEDICINE

## 2018-01-01 PROCEDURE — 71101 X-RAY EXAM UNILAT RIBS/CHEST: CPT

## 2018-01-01 PROCEDURE — 93005 ELECTROCARDIOGRAM TRACING: CPT | Performed by: NURSE PRACTITIONER

## 2018-01-01 PROCEDURE — 96374 THER/PROPH/DIAG INJ IV PUSH: CPT

## 2018-01-01 PROCEDURE — 25010000002 IOPAMIDOL 61 % SOLUTION: Performed by: EMERGENCY MEDICINE

## 2018-01-01 PROCEDURE — 99285 EMERGENCY DEPT VISIT HI MDM: CPT

## 2018-01-01 PROCEDURE — 84439 ASSAY OF FREE THYROXINE: CPT | Performed by: EMERGENCY MEDICINE

## 2018-01-01 PROCEDURE — 87798 DETECT AGENT NOS DNA AMP: CPT | Performed by: INTERNAL MEDICINE

## 2018-01-01 PROCEDURE — 80053 COMPREHEN METABOLIC PANEL: CPT | Performed by: NURSE PRACTITIONER

## 2018-01-01 PROCEDURE — 71250 CT THORAX DX C-: CPT

## 2018-01-01 PROCEDURE — 84145 PROCALCITONIN (PCT): CPT | Performed by: INTERNAL MEDICINE

## 2018-01-01 PROCEDURE — 85025 COMPLETE CBC W/AUTO DIFF WBC: CPT | Performed by: EMERGENCY MEDICINE

## 2018-01-01 PROCEDURE — 99231 SBSQ HOSP IP/OBS SF/LOW 25: CPT | Performed by: COLON & RECTAL SURGERY

## 2018-01-01 RX ORDER — ESCITALOPRAM OXALATE 5 MG/1
10 TABLET ORAL DAILY
Status: ON HOLD | COMMUNITY
End: 2019-01-01

## 2018-01-01 RX ORDER — ALBUTEROL SULFATE 2.5 MG/3ML
2.5 SOLUTION RESPIRATORY (INHALATION)
Status: COMPLETED | OUTPATIENT
Start: 2018-01-01 | End: 2018-01-01

## 2018-01-01 RX ORDER — L.ACID,PARA/B.BIFIDUM/S.THERM 8B CELL
1 CAPSULE ORAL DAILY
Status: DISCONTINUED | OUTPATIENT
Start: 2018-01-01 | End: 2018-01-01 | Stop reason: HOSPADM

## 2018-01-01 RX ORDER — MELATONIN
1500 DAILY
Status: DISCONTINUED | OUTPATIENT
Start: 2018-01-01 | End: 2018-01-01 | Stop reason: HOSPADM

## 2018-01-01 RX ORDER — BUDESONIDE AND FORMOTEROL FUMARATE DIHYDRATE 160; 4.5 UG/1; UG/1
2 AEROSOL RESPIRATORY (INHALATION)
Status: DISCONTINUED | OUTPATIENT
Start: 2018-01-01 | End: 2018-01-01 | Stop reason: HOSPADM

## 2018-01-01 RX ORDER — POTASSIUM CHLORIDE 750 MG/1
10 CAPSULE, EXTENDED RELEASE ORAL 2 TIMES DAILY
Status: ON HOLD | COMMUNITY
End: 2019-01-01

## 2018-01-01 RX ORDER — PREDNISONE 20 MG/1
40 TABLET ORAL
Status: DISCONTINUED | OUTPATIENT
Start: 2018-01-01 | End: 2018-01-01 | Stop reason: HOSPADM

## 2018-01-01 RX ORDER — DOCUSATE SODIUM 100 MG/1
100 CAPSULE, LIQUID FILLED ORAL 2 TIMES DAILY PRN
Status: DISCONTINUED | OUTPATIENT
Start: 2018-01-01 | End: 2018-01-01 | Stop reason: HOSPADM

## 2018-01-01 RX ORDER — ONDANSETRON 4 MG/1
4 TABLET, FILM COATED ORAL EVERY 6 HOURS PRN
Status: DISCONTINUED | OUTPATIENT
Start: 2018-01-01 | End: 2018-01-01 | Stop reason: HOSPADM

## 2018-01-01 RX ORDER — ALBUTEROL SULFATE 2.5 MG/3ML
2.5 SOLUTION RESPIRATORY (INHALATION) EVERY 4 HOURS PRN
Qty: 50 VIAL | Refills: 0 | Status: SHIPPED | OUTPATIENT
Start: 2018-01-01

## 2018-01-01 RX ORDER — METRONIDAZOLE 500 MG/1
500 TABLET ORAL EVERY 8 HOURS SCHEDULED
Status: DISCONTINUED | OUTPATIENT
Start: 2018-01-01 | End: 2018-01-01

## 2018-01-01 RX ORDER — METHYLPREDNISOLONE SODIUM SUCCINATE 125 MG/2ML
60 INJECTION, POWDER, LYOPHILIZED, FOR SOLUTION INTRAMUSCULAR; INTRAVENOUS EVERY 8 HOURS
Status: DISCONTINUED | OUTPATIENT
Start: 2018-01-01 | End: 2018-01-01

## 2018-01-01 RX ORDER — ESCITALOPRAM OXALATE 5 MG/1
5 TABLET ORAL DAILY
Status: DISCONTINUED | OUTPATIENT
Start: 2018-01-01 | End: 2018-01-01 | Stop reason: HOSPADM

## 2018-01-01 RX ORDER — ONDANSETRON 4 MG/1
4 TABLET, ORALLY DISINTEGRATING ORAL EVERY 6 HOURS PRN
Status: DISCONTINUED | OUTPATIENT
Start: 2018-01-01 | End: 2018-01-01 | Stop reason: HOSPADM

## 2018-01-01 RX ORDER — LEVOFLOXACIN 5 MG/ML
750 INJECTION, SOLUTION INTRAVENOUS
Status: DISCONTINUED | OUTPATIENT
Start: 2018-01-01 | End: 2018-01-01

## 2018-01-01 RX ORDER — ACETAMINOPHEN 325 MG/1
650 TABLET ORAL EVERY 4 HOURS PRN
Status: DISCONTINUED | OUTPATIENT
Start: 2018-01-01 | End: 2018-01-01 | Stop reason: HOSPADM

## 2018-01-01 RX ORDER — ALBUTEROL SULFATE 90 UG/1
1-2 AEROSOL, METERED RESPIRATORY (INHALATION) EVERY 4 HOURS PRN
COMMUNITY

## 2018-01-01 RX ORDER — ATORVASTATIN CALCIUM 10 MG/1
10 TABLET, FILM COATED ORAL DAILY
Status: DISCONTINUED | OUTPATIENT
Start: 2018-01-01 | End: 2018-01-01 | Stop reason: HOSPADM

## 2018-01-01 RX ORDER — SODIUM CHLORIDE 9 MG/ML
125 INJECTION, SOLUTION INTRAVENOUS CONTINUOUS
Status: DISCONTINUED | OUTPATIENT
Start: 2018-01-01 | End: 2018-01-01

## 2018-01-01 RX ORDER — SODIUM CHLORIDE 0.9 % (FLUSH) 0.9 %
10 SYRINGE (ML) INJECTION AS NEEDED
Status: DISCONTINUED | OUTPATIENT
Start: 2018-01-01 | End: 2018-01-01 | Stop reason: HOSPADM

## 2018-01-01 RX ORDER — GUAIFENESIN 600 MG/1
1200 TABLET, EXTENDED RELEASE ORAL EVERY 12 HOURS
Start: 2018-01-01

## 2018-01-01 RX ORDER — SODIUM CHLORIDE AND POTASSIUM CHLORIDE 150; 900 MG/100ML; MG/100ML
125 INJECTION, SOLUTION INTRAVENOUS CONTINUOUS
Status: DISCONTINUED | OUTPATIENT
Start: 2018-01-01 | End: 2018-01-01

## 2018-01-01 RX ORDER — METHYLPREDNISOLONE SODIUM SUCCINATE 125 MG/2ML
125 INJECTION, POWDER, LYOPHILIZED, FOR SOLUTION INTRAMUSCULAR; INTRAVENOUS ONCE
Status: COMPLETED | OUTPATIENT
Start: 2018-01-01 | End: 2018-01-01

## 2018-01-01 RX ORDER — NALOXONE HCL 0.4 MG/ML
0.4 VIAL (ML) INJECTION
Status: DISCONTINUED | OUTPATIENT
Start: 2018-01-01 | End: 2018-01-01 | Stop reason: HOSPADM

## 2018-01-01 RX ORDER — LOPERAMIDE HYDROCHLORIDE 2 MG/1
2 CAPSULE ORAL 4 TIMES DAILY PRN
Status: DISCONTINUED | OUTPATIENT
Start: 2018-01-01 | End: 2018-01-01 | Stop reason: HOSPADM

## 2018-01-01 RX ORDER — NICOTINE 21 MG/24HR
1 PATCH, TRANSDERMAL 24 HOURS TRANSDERMAL EVERY 24 HOURS
Status: DISCONTINUED | OUTPATIENT
Start: 2018-01-01 | End: 2018-01-01 | Stop reason: HOSPADM

## 2018-01-01 RX ORDER — IPRATROPIUM BROMIDE AND ALBUTEROL SULFATE 2.5; .5 MG/3ML; MG/3ML
3 SOLUTION RESPIRATORY (INHALATION) ONCE
Status: COMPLETED | OUTPATIENT
Start: 2018-01-01 | End: 2018-01-01

## 2018-01-01 RX ORDER — FUROSEMIDE 40 MG/1
40 TABLET ORAL DAILY
Status: DISCONTINUED | OUTPATIENT
Start: 2018-01-01 | End: 2018-01-01 | Stop reason: HOSPADM

## 2018-01-01 RX ORDER — LIDOCAINE 50 MG/G
1 PATCH TOPICAL
Status: DISCONTINUED | OUTPATIENT
Start: 2018-01-01 | End: 2018-01-01 | Stop reason: HOSPADM

## 2018-01-01 RX ORDER — SODIUM CHLORIDE 9 MG/ML
75 INJECTION, SOLUTION INTRAVENOUS CONTINUOUS
Status: DISCONTINUED | OUTPATIENT
Start: 2018-01-01 | End: 2018-01-01

## 2018-01-01 RX ORDER — HEPARIN SODIUM 5000 [USP'U]/ML
5000 INJECTION, SOLUTION INTRAVENOUS; SUBCUTANEOUS EVERY 12 HOURS SCHEDULED
Status: DISCONTINUED | OUTPATIENT
Start: 2018-01-01 | End: 2018-01-01 | Stop reason: HOSPADM

## 2018-01-01 RX ORDER — IPRATROPIUM BROMIDE AND ALBUTEROL SULFATE 2.5; .5 MG/3ML; MG/3ML
3 SOLUTION RESPIRATORY (INHALATION)
Status: DISCONTINUED | OUTPATIENT
Start: 2018-01-01 | End: 2018-01-01

## 2018-01-01 RX ORDER — BUDESONIDE AND FORMOTEROL FUMARATE DIHYDRATE 160; 4.5 UG/1; UG/1
2 AEROSOL RESPIRATORY (INHALATION)
Status: DISCONTINUED | OUTPATIENT
Start: 2018-01-01 | End: 2018-01-01

## 2018-01-01 RX ORDER — PREDNISONE 10 MG/1
TABLET ORAL
Qty: 31 TABLET | Refills: 0 | Status: SHIPPED | OUTPATIENT
Start: 2018-01-01 | End: 2018-01-01 | Stop reason: HOSPADM

## 2018-01-01 RX ORDER — HYDROMORPHONE HYDROCHLORIDE 1 MG/ML
0.25 INJECTION, SOLUTION INTRAMUSCULAR; INTRAVENOUS; SUBCUTANEOUS EVERY 4 HOURS PRN
Status: DISCONTINUED | OUTPATIENT
Start: 2018-01-01 | End: 2018-01-01 | Stop reason: HOSPADM

## 2018-01-01 RX ORDER — IPRATROPIUM BROMIDE AND ALBUTEROL SULFATE 2.5; .5 MG/3ML; MG/3ML
3 SOLUTION RESPIRATORY (INHALATION)
Status: DISCONTINUED | OUTPATIENT
Start: 2018-01-01 | End: 2018-01-01 | Stop reason: HOSPADM

## 2018-01-01 RX ORDER — FUROSEMIDE 40 MG/1
40 TABLET ORAL DAILY
Status: ON HOLD | COMMUNITY
End: 2019-01-01

## 2018-01-01 RX ORDER — ESCITALOPRAM OXALATE 10 MG/1
10 TABLET ORAL DAILY
Status: DISCONTINUED | OUTPATIENT
Start: 2018-01-01 | End: 2018-01-01 | Stop reason: HOSPADM

## 2018-01-01 RX ORDER — NICOTINE 21 MG/24HR
1 PATCH, TRANSDERMAL 24 HOURS TRANSDERMAL EVERY 24 HOURS
Status: ON HOLD
Start: 2018-01-01 | End: 2019-01-01

## 2018-01-01 RX ORDER — BISACODYL 10 MG
10 SUPPOSITORY, RECTAL RECTAL DAILY PRN
Status: DISCONTINUED | OUTPATIENT
Start: 2018-01-01 | End: 2018-01-01 | Stop reason: HOSPADM

## 2018-01-01 RX ORDER — POTASSIUM CHLORIDE 750 MG/1
10 CAPSULE, EXTENDED RELEASE ORAL DAILY
Status: DISCONTINUED | OUTPATIENT
Start: 2018-01-01 | End: 2018-01-01 | Stop reason: HOSPADM

## 2018-01-01 RX ORDER — CALCIUM CARBONATE 200(500)MG
1 TABLET,CHEWABLE ORAL 2 TIMES DAILY PRN
Status: DISCONTINUED | OUTPATIENT
Start: 2018-01-01 | End: 2018-01-01 | Stop reason: HOSPADM

## 2018-01-01 RX ORDER — SENNOSIDES 8.6 MG
325 CAPSULE ORAL EVERY 6 HOURS PRN
Status: ON HOLD | COMMUNITY
End: 2019-01-01

## 2018-01-01 RX ORDER — ONDANSETRON 2 MG/ML
4 INJECTION INTRAMUSCULAR; INTRAVENOUS EVERY 6 HOURS PRN
Status: DISCONTINUED | OUTPATIENT
Start: 2018-01-01 | End: 2018-01-01 | Stop reason: HOSPADM

## 2018-01-01 RX ORDER — ALBUTEROL SULFATE 2.5 MG/3ML
2.5 SOLUTION RESPIRATORY (INHALATION) ONCE
Status: COMPLETED | OUTPATIENT
Start: 2018-01-01 | End: 2018-01-01

## 2018-01-01 RX ORDER — GUAIFENESIN 600 MG/1
1200 TABLET, EXTENDED RELEASE ORAL EVERY 12 HOURS
Status: DISCONTINUED | OUTPATIENT
Start: 2018-01-01 | End: 2018-01-01 | Stop reason: HOSPADM

## 2018-01-01 RX ORDER — SODIUM CHLORIDE 0.9 % (FLUSH) 0.9 %
1-10 SYRINGE (ML) INJECTION AS NEEDED
Status: DISCONTINUED | OUTPATIENT
Start: 2018-01-01 | End: 2018-01-01 | Stop reason: HOSPADM

## 2018-01-01 RX ORDER — LEVOFLOXACIN 5 MG/ML
500 INJECTION, SOLUTION INTRAVENOUS ONCE
Status: COMPLETED | OUTPATIENT
Start: 2018-01-01 | End: 2018-01-01

## 2018-01-01 RX ORDER — LIDOCAINE 50 MG/G
1 PATCH TOPICAL
Status: ON HOLD
Start: 2018-01-01 | End: 2019-01-01

## 2018-01-01 RX ORDER — ALBUTEROL SULFATE 2.5 MG/3ML
2.5 SOLUTION RESPIRATORY (INHALATION)
Status: DISCONTINUED | OUTPATIENT
Start: 2018-01-01 | End: 2018-01-01 | Stop reason: HOSPADM

## 2018-01-01 RX ORDER — ALBUTEROL SULFATE 2.5 MG/3ML
2.5 SOLUTION RESPIRATORY (INHALATION) EVERY 4 HOURS PRN
Status: DISCONTINUED | OUTPATIENT
Start: 2018-01-01 | End: 2018-01-01 | Stop reason: HOSPADM

## 2018-01-01 RX ORDER — METHYLPREDNISOLONE SODIUM SUCCINATE 125 MG/2ML
80 INJECTION, POWDER, LYOPHILIZED, FOR SOLUTION INTRAMUSCULAR; INTRAVENOUS EVERY 6 HOURS
Status: DISCONTINUED | OUTPATIENT
Start: 2018-01-01 | End: 2018-01-01

## 2018-01-01 RX ORDER — LOPERAMIDE HYDROCHLORIDE 2 MG/1
2 CAPSULE ORAL 4 TIMES DAILY PRN
Status: ON HOLD | COMMUNITY
End: 2019-01-01

## 2018-01-01 RX ORDER — SODIUM CHLORIDE AND POTASSIUM CHLORIDE 150; 900 MG/100ML; MG/100ML
75 INJECTION, SOLUTION INTRAVENOUS CONTINUOUS
Status: DISCONTINUED | OUTPATIENT
Start: 2018-01-01 | End: 2018-01-01 | Stop reason: HOSPADM

## 2018-01-01 RX ORDER — ACETAMINOPHEN 325 MG/1
650 TABLET ORAL EVERY 6 HOURS PRN
Status: DISCONTINUED | OUTPATIENT
Start: 2018-01-01 | End: 2018-01-01 | Stop reason: HOSPADM

## 2018-01-01 RX ADMIN — ENOXAPARIN SODIUM 40 MG: 100 INJECTION SUBCUTANEOUS at 17:59

## 2018-01-01 RX ADMIN — FUROSEMIDE 40 MG: 40 TABLET ORAL at 23:04

## 2018-01-01 RX ADMIN — IPRATROPIUM BROMIDE AND ALBUTEROL SULFATE 3 ML: .5; 3 SOLUTION RESPIRATORY (INHALATION) at 19:00

## 2018-01-01 RX ADMIN — IPRATROPIUM BROMIDE AND ALBUTEROL SULFATE 3 ML: .5; 3 SOLUTION RESPIRATORY (INHALATION) at 23:12

## 2018-01-01 RX ADMIN — POTASSIUM CHLORIDE 10 MEQ: 750 CAPSULE, EXTENDED RELEASE ORAL at 09:53

## 2018-01-01 RX ADMIN — METHYLPREDNISOLONE SODIUM SUCCINATE 60 MG: 125 INJECTION, POWDER, FOR SOLUTION INTRAMUSCULAR; INTRAVENOUS at 04:18

## 2018-01-01 RX ADMIN — METRONIDAZOLE 500 MG: 500 INJECTION, SOLUTION INTRAVENOUS at 05:15

## 2018-01-01 RX ADMIN — ALBUTEROL SULFATE 2.5 MG: 2.5 SOLUTION RESPIRATORY (INHALATION) at 11:41

## 2018-01-01 RX ADMIN — POTASSIUM CHLORIDE 10 MEQ: 750 CAPSULE, EXTENDED RELEASE ORAL at 23:04

## 2018-01-01 RX ADMIN — TIOTROPIUM BROMIDE 1 CAPSULE: 18 CAPSULE ORAL; RESPIRATORY (INHALATION) at 08:09

## 2018-01-01 RX ADMIN — VITAMIN D, TAB 1000IU (100/BT) 1500 UNITS: 25 TAB at 09:32

## 2018-01-01 RX ADMIN — BUDESONIDE AND FORMOTEROL FUMARATE DIHYDRATE 2 PUFF: 160; 4.5 AEROSOL RESPIRATORY (INHALATION) at 19:44

## 2018-01-01 RX ADMIN — METHYLPREDNISOLONE SODIUM SUCCINATE 60 MG: 125 INJECTION, POWDER, FOR SOLUTION INTRAMUSCULAR; INTRAVENOUS at 03:52

## 2018-01-01 RX ADMIN — POLYETHYLENE GLYCOL 3350 17 G: 17 POWDER, FOR SOLUTION ORAL at 09:23

## 2018-01-01 RX ADMIN — ESCITALOPRAM 5 MG: 5 TABLET, FILM COATED ORAL at 09:07

## 2018-01-01 RX ADMIN — VITAMIN D, TAB 1000IU (100/BT) 1500 UNITS: 25 TAB at 08:49

## 2018-01-01 RX ADMIN — VITAMIN D, TAB 1000IU (100/BT) 1500 UNITS: 25 TAB at 09:15

## 2018-01-01 RX ADMIN — GUAIFENESIN 1200 MG: 600 TABLET, EXTENDED RELEASE ORAL at 09:32

## 2018-01-01 RX ADMIN — NICOTINE 1 PATCH: 21 PATCH, EXTENDED RELEASE TRANSDERMAL at 18:33

## 2018-01-01 RX ADMIN — ENOXAPARIN SODIUM 40 MG: 100 INJECTION SUBCUTANEOUS at 18:32

## 2018-01-01 RX ADMIN — IOPAMIDOL 85 ML: 612 INJECTION, SOLUTION INTRAVENOUS at 12:57

## 2018-01-01 RX ADMIN — POLYETHYLENE GLYCOL 3350 17 G: 17 POWDER, FOR SOLUTION ORAL at 08:56

## 2018-01-01 RX ADMIN — HEPARIN SODIUM 5000 UNITS: 5000 INJECTION, SOLUTION INTRAVENOUS; SUBCUTANEOUS at 23:05

## 2018-01-01 RX ADMIN — POTASSIUM CHLORIDE 10 MEQ: 750 CAPSULE, EXTENDED RELEASE ORAL at 09:07

## 2018-01-01 RX ADMIN — ALBUTEROL SULFATE 2.5 MG: 2.5 SOLUTION RESPIRATORY (INHALATION) at 12:27

## 2018-01-01 RX ADMIN — ALBUTEROL SULFATE 2.5 MG: 2.5 SOLUTION RESPIRATORY (INHALATION) at 03:24

## 2018-01-01 RX ADMIN — ALBUTEROL SULFATE 2.5 MG: 2.5 SOLUTION RESPIRATORY (INHALATION) at 19:19

## 2018-01-01 RX ADMIN — ATORVASTATIN CALCIUM 10 MG: 10 TABLET, FILM COATED ORAL at 23:05

## 2018-01-01 RX ADMIN — SODIUM CHLORIDE 75 ML/HR: 9 INJECTION, SOLUTION INTRAVENOUS at 11:08

## 2018-01-01 RX ADMIN — LIDOCAINE 1 PATCH: 50 PATCH CUTANEOUS at 22:26

## 2018-01-01 RX ADMIN — Medication 1 CAPSULE: at 09:33

## 2018-01-01 RX ADMIN — ALBUTEROL SULFATE 2.5 MG: 2.5 SOLUTION RESPIRATORY (INHALATION) at 15:45

## 2018-01-01 RX ADMIN — ALBUTEROL SULFATE 2.5 MG: 2.5 SOLUTION RESPIRATORY (INHALATION) at 08:08

## 2018-01-01 RX ADMIN — METRONIDAZOLE 500 MG: 500 INJECTION, SOLUTION INTRAVENOUS at 15:02

## 2018-01-01 RX ADMIN — HEPARIN SODIUM 5000 UNITS: 5000 INJECTION, SOLUTION INTRAVENOUS; SUBCUTANEOUS at 20:35

## 2018-01-01 RX ADMIN — GUAIFENESIN 1200 MG: 600 TABLET, EXTENDED RELEASE ORAL at 21:09

## 2018-01-01 RX ADMIN — METHYLPREDNISOLONE SODIUM SUCCINATE 80 MG: 125 INJECTION, POWDER, FOR SOLUTION INTRAMUSCULAR; INTRAVENOUS at 17:59

## 2018-01-01 RX ADMIN — ESCITALOPRAM 10 MG: 10 TABLET, FILM COATED ORAL at 09:15

## 2018-01-01 RX ADMIN — IPRATROPIUM BROMIDE AND ALBUTEROL SULFATE 3 ML: .5; 3 SOLUTION RESPIRATORY (INHALATION) at 03:03

## 2018-01-01 RX ADMIN — GUAIFENESIN 1200 MG: 600 TABLET, EXTENDED RELEASE ORAL at 21:33

## 2018-01-01 RX ADMIN — ALBUTEROL SULFATE 2.5 MG: 2.5 SOLUTION RESPIRATORY (INHALATION) at 11:28

## 2018-01-01 RX ADMIN — BUDESONIDE AND FORMOTEROL FUMARATE DIHYDRATE 2 PUFF: 160; 4.5 AEROSOL RESPIRATORY (INHALATION) at 06:48

## 2018-01-01 RX ADMIN — TIOTROPIUM BROMIDE 1 CAPSULE: 18 CAPSULE ORAL; RESPIRATORY (INHALATION) at 09:23

## 2018-01-01 RX ADMIN — ENOXAPARIN SODIUM 40 MG: 100 INJECTION SUBCUTANEOUS at 18:04

## 2018-01-01 RX ADMIN — IPRATROPIUM BROMIDE AND ALBUTEROL SULFATE 3 ML: .5; 3 SOLUTION RESPIRATORY (INHALATION) at 07:06

## 2018-01-01 RX ADMIN — POTASSIUM CHLORIDE AND SODIUM CHLORIDE 125 ML/HR: 900; 150 INJECTION, SOLUTION INTRAVENOUS at 22:33

## 2018-01-01 RX ADMIN — Medication 1 CAPSULE: at 22:27

## 2018-01-01 RX ADMIN — ESCITALOPRAM 10 MG: 10 TABLET, FILM COATED ORAL at 09:33

## 2018-01-01 RX ADMIN — BUDESONIDE AND FORMOTEROL FUMARATE DIHYDRATE 2 PUFF: 160; 4.5 AEROSOL RESPIRATORY (INHALATION) at 09:23

## 2018-01-01 RX ADMIN — IPRATROPIUM BROMIDE AND ALBUTEROL SULFATE 3 ML: .5; 3 SOLUTION RESPIRATORY (INHALATION) at 09:23

## 2018-01-01 RX ADMIN — METRONIDAZOLE 500 MG: 500 INJECTION, SOLUTION INTRAVENOUS at 22:39

## 2018-01-01 RX ADMIN — ALBUTEROL SULFATE 2.5 MG: 2.5 SOLUTION RESPIRATORY (INHALATION) at 23:41

## 2018-01-01 RX ADMIN — GUAIFENESIN 1200 MG: 600 TABLET, EXTENDED RELEASE ORAL at 09:15

## 2018-01-01 RX ADMIN — METHYLPREDNISOLONE SODIUM SUCCINATE 60 MG: 125 INJECTION, POWDER, FOR SOLUTION INTRAMUSCULAR; INTRAVENOUS at 12:32

## 2018-01-01 RX ADMIN — IPRATROPIUM BROMIDE AND ALBUTEROL SULFATE 3 ML: .5; 3 SOLUTION RESPIRATORY (INHALATION) at 15:35

## 2018-01-01 RX ADMIN — SODIUM CHLORIDE 1000 ML: 9 INJECTION, SOLUTION INTRAVENOUS at 12:17

## 2018-01-01 RX ADMIN — METHYLPREDNISOLONE SODIUM SUCCINATE 80 MG: 125 INJECTION, POWDER, FOR SOLUTION INTRAMUSCULAR; INTRAVENOUS at 05:24

## 2018-01-01 RX ADMIN — IPRATROPIUM BROMIDE AND ALBUTEROL SULFATE 3 ML: .5; 3 SOLUTION RESPIRATORY (INHALATION) at 15:02

## 2018-01-01 RX ADMIN — GUAIFENESIN 1200 MG: 600 TABLET, EXTENDED RELEASE ORAL at 22:27

## 2018-01-01 RX ADMIN — HEPARIN SODIUM 5000 UNITS: 5000 INJECTION, SOLUTION INTRAVENOUS; SUBCUTANEOUS at 09:52

## 2018-01-01 RX ADMIN — BUDESONIDE AND FORMOTEROL FUMARATE DIHYDRATE 2 PUFF: 160; 4.5 AEROSOL RESPIRATORY (INHALATION) at 07:06

## 2018-01-01 RX ADMIN — ESCITALOPRAM 10 MG: 10 TABLET, FILM COATED ORAL at 22:27

## 2018-01-01 RX ADMIN — BUDESONIDE AND FORMOTEROL FUMARATE DIHYDRATE 2 PUFF: 160; 4.5 AEROSOL RESPIRATORY (INHALATION) at 19:19

## 2018-01-01 RX ADMIN — FUROSEMIDE 40 MG: 40 TABLET ORAL at 09:07

## 2018-01-01 RX ADMIN — Medication 1 CAPSULE: at 09:15

## 2018-01-01 RX ADMIN — BUDESONIDE AND FORMOTEROL FUMARATE DIHYDRATE 2 PUFF: 160; 4.5 AEROSOL RESPIRATORY (INHALATION) at 23:12

## 2018-01-01 RX ADMIN — IPRATROPIUM BROMIDE AND ALBUTEROL SULFATE 3 ML: .5; 3 SOLUTION RESPIRATORY (INHALATION) at 07:35

## 2018-01-01 RX ADMIN — METRONIDAZOLE 500 MG: 500 INJECTION, SOLUTION INTRAVENOUS at 13:41

## 2018-01-01 RX ADMIN — METRONIDAZOLE 500 MG: 500 INJECTION, SOLUTION INTRAVENOUS at 21:08

## 2018-01-01 RX ADMIN — IPRATROPIUM BROMIDE AND ALBUTEROL SULFATE 3 ML: .5; 3 SOLUTION RESPIRATORY (INHALATION) at 11:28

## 2018-01-01 RX ADMIN — VITAMIN D, TAB 1000IU (100/BT) 1500 UNITS: 25 TAB at 22:49

## 2018-01-01 RX ADMIN — NICOTINE 1 PATCH: 21 PATCH, EXTENDED RELEASE TRANSDERMAL at 22:26

## 2018-01-01 RX ADMIN — ESCITALOPRAM 5 MG: 5 TABLET, FILM COATED ORAL at 23:05

## 2018-01-01 RX ADMIN — NICOTINE 1 PATCH: 21 PATCH, EXTENDED RELEASE TRANSDERMAL at 18:03

## 2018-01-01 RX ADMIN — BUDESONIDE AND FORMOTEROL FUMARATE DIHYDRATE 2 PUFF: 160; 4.5 AEROSOL RESPIRATORY (INHALATION) at 08:08

## 2018-01-01 RX ADMIN — SODIUM CHLORIDE 500 ML: 9 INJECTION, SOLUTION INTRAVENOUS at 15:12

## 2018-01-01 RX ADMIN — IPRATROPIUM BROMIDE AND ALBUTEROL SULFATE 3 ML: .5; 3 SOLUTION RESPIRATORY (INHALATION) at 06:38

## 2018-01-01 RX ADMIN — ATORVASTATIN CALCIUM 10 MG: 10 TABLET, FILM COATED ORAL at 09:07

## 2018-01-01 RX ADMIN — ESCITALOPRAM 10 MG: 10 TABLET, FILM COATED ORAL at 08:50

## 2018-01-01 RX ADMIN — LIDOCAINE 1 PATCH: 50 PATCH CUTANEOUS at 21:08

## 2018-01-01 RX ADMIN — HEPARIN SODIUM 5000 UNITS: 5000 INJECTION, SOLUTION INTRAVENOUS; SUBCUTANEOUS at 09:12

## 2018-01-01 RX ADMIN — IPRATROPIUM BROMIDE AND ALBUTEROL SULFATE 3 ML: .5; 3 SOLUTION RESPIRATORY (INHALATION) at 12:44

## 2018-01-01 RX ADMIN — IPRATROPIUM BROMIDE AND ALBUTEROL SULFATE 3 ML: .5; 3 SOLUTION RESPIRATORY (INHALATION) at 11:04

## 2018-01-01 RX ADMIN — BUDESONIDE AND FORMOTEROL FUMARATE DIHYDRATE 2 PUFF: 160; 4.5 AEROSOL RESPIRATORY (INHALATION) at 07:35

## 2018-01-01 RX ADMIN — IPRATROPIUM BROMIDE AND ALBUTEROL SULFATE 3 ML: .5; 3 SOLUTION RESPIRATORY (INHALATION) at 19:26

## 2018-01-01 RX ADMIN — IPRATROPIUM BROMIDE AND ALBUTEROL SULFATE 3 ML: .5; 3 SOLUTION RESPIRATORY (INHALATION) at 12:27

## 2018-01-01 RX ADMIN — BUDESONIDE AND FORMOTEROL FUMARATE DIHYDRATE 2 PUFF: 160; 4.5 AEROSOL RESPIRATORY (INHALATION) at 20:24

## 2018-01-01 RX ADMIN — METHYLPREDNISOLONE SODIUM SUCCINATE 60 MG: 125 INJECTION, POWDER, FOR SOLUTION INTRAMUSCULAR; INTRAVENOUS at 20:03

## 2018-01-01 RX ADMIN — Medication 1 CAPSULE: at 08:50

## 2018-01-01 RX ADMIN — METHYLPREDNISOLONE SODIUM SUCCINATE 125 MG: 125 INJECTION, POWDER, FOR SOLUTION INTRAMUSCULAR; INTRAVENOUS at 12:17

## 2018-01-01 RX ADMIN — IPRATROPIUM BROMIDE AND ALBUTEROL SULFATE 3 ML: .5; 3 SOLUTION RESPIRATORY (INHALATION) at 23:35

## 2018-01-01 RX ADMIN — POTASSIUM CHLORIDE AND SODIUM CHLORIDE 75 ML/HR: 900; 150 INJECTION, SOLUTION INTRAVENOUS at 02:45

## 2018-01-01 RX ADMIN — IPRATROPIUM BROMIDE AND ALBUTEROL SULFATE 3 ML: .5; 3 SOLUTION RESPIRATORY (INHALATION) at 19:45

## 2018-01-01 RX ADMIN — METHYLPREDNISOLONE SODIUM SUCCINATE 80 MG: 125 INJECTION, POWDER, FOR SOLUTION INTRAMUSCULAR; INTRAVENOUS at 22:49

## 2018-01-01 RX ADMIN — METHYLPREDNISOLONE SODIUM SUCCINATE 60 MG: 125 INJECTION, POWDER, FOR SOLUTION INTRAMUSCULAR; INTRAVENOUS at 20:36

## 2018-01-01 RX ADMIN — METHYLPREDNISOLONE SODIUM SUCCINATE 125 MG: 125 INJECTION, POWDER, FOR SOLUTION INTRAMUSCULAR; INTRAVENOUS at 11:49

## 2018-01-01 RX ADMIN — LIDOCAINE 1 PATCH: 50 PATCH CUTANEOUS at 21:11

## 2018-01-01 RX ADMIN — POTASSIUM CHLORIDE AND SODIUM CHLORIDE 75 ML/HR: 900; 150 INJECTION, SOLUTION INTRAVENOUS at 13:52

## 2018-01-01 RX ADMIN — ALBUTEROL SULFATE 2.5 MG: 2.5 SOLUTION RESPIRATORY (INHALATION) at 11:42

## 2018-01-01 RX ADMIN — IPRATROPIUM BROMIDE AND ALBUTEROL SULFATE 3 ML: .5; 3 SOLUTION RESPIRATORY (INHALATION) at 20:24

## 2018-01-01 RX ADMIN — IPRATROPIUM BROMIDE AND ALBUTEROL SULFATE 3 ML: .5; 3 SOLUTION RESPIRATORY (INHALATION) at 10:31

## 2018-01-01 RX ADMIN — GUAIFENESIN 1200 MG: 600 TABLET, EXTENDED RELEASE ORAL at 10:54

## 2018-01-01 RX ADMIN — ACETAMINOPHEN 650 MG: 325 TABLET, FILM COATED ORAL at 09:34

## 2018-01-01 RX ADMIN — LEVOFLOXACIN 500 MG: 5 INJECTION, SOLUTION INTRAVENOUS at 16:11

## 2018-01-01 RX ADMIN — ALBUTEROL SULFATE 2.5 MG: 2.5 SOLUTION RESPIRATORY (INHALATION) at 15:44

## 2018-01-01 RX ADMIN — METRONIDAZOLE 500 MG: 500 INJECTION, SOLUTION INTRAVENOUS at 06:10

## 2018-01-01 RX ADMIN — BUDESONIDE AND FORMOTEROL FUMARATE DIHYDRATE 2 PUFF: 160; 4.5 AEROSOL RESPIRATORY (INHALATION) at 19:26

## 2018-04-26 PROBLEM — E78.5 HYPERLIPIDEMIA: Status: ACTIVE | Noted: 2018-01-01

## 2018-04-26 PROBLEM — F41.9 ANXIETY: Status: ACTIVE | Noted: 2018-01-01

## 2018-04-26 PROBLEM — J44.1 COPD EXACERBATION (HCC): Status: ACTIVE | Noted: 2018-01-01

## 2018-04-27 PROBLEM — J96.22 ACUTE ON CHRONIC RESPIRATORY FAILURE WITH HYPERCAPNIA (HCC): Status: ACTIVE | Noted: 2018-01-01

## 2018-04-27 PROBLEM — G92.9 TOXIC ENCEPHALOPATHY: Status: ACTIVE | Noted: 2018-01-01

## 2018-07-10 PROBLEM — Z72.0 TOBACCO ABUSE: Chronic | Status: ACTIVE | Noted: 2018-01-01

## 2018-07-10 PROBLEM — K57.20 DIVERTICULITIS OF LARGE INTESTINE WITH ABSCESS WITHOUT BLEEDING: Status: ACTIVE | Noted: 2018-01-01

## 2018-07-10 PROBLEM — G47.419 NARCOLEPSY: Chronic | Status: ACTIVE | Noted: 2018-01-01

## 2018-07-10 PROBLEM — S22.32XA CLOSED FRACTURE OF ONE RIB OF LEFT SIDE: Status: ACTIVE | Noted: 2018-01-01
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
## 2018-07-10 NOTE — ED PROVIDER NOTES
" EMERGENCY DEPARTMENT ENCOUNTER    Room Number:  17/17  Date seen:  7/10/2018  Time seen: 11:51 AM  PCP: Héctor Abbott MD  Historian: Patient, Family      HPI:  Chief Complaint: Generalized Weakness  Context: Lauren Monreal is an 82 y.o. female with h/o COPD for which pt is supposed to be on supplemental O2 continuously. Pt reports that she has not been using her supplemental O2 continuously recently. Pt presents to the ED c/o constant generalized weakness onset several days ago. Due to the generalized weakness, pt fell out of a carvalho about 3 days ago while pt was sleeping. During the fall, pt sustained injury to the head and left chest wall. Pt reports that immediately after sustaining the fall, pt was unable to get up off of the floor for about 15 minutes due to the generalized weakness. Consequently, pt contacted her son, who assisted pt off of the floor. Pt denies LOC, nausea, vomiting, neck pain, focal weakness/numbness, speech/visual difficulties, palpitations, and headache. However, per family, since the fall, pt has had intermittent episodes of dyspnea and decreased appetite/PO intake. There are no other complaints at this time.     Location: Generalized body  Radiation: N/A  Quality: \"generalized weakness\"  Intensity/Severity: Moderate  Duration: Onset several days ago  Onset quality: Gradual in onset  Timing: Constant   Progression: Worsening   Aggravating Factors: Movement  Alleviating Factors: Nothing  Previous Episodes: Unknown  Treatment before arrival: Unknown   Associated Symptoms: Head injury, left chest wall pain, dyspnea, decreased appetite/PO intake         MEDICAL RECORD REVIEW    Pt was admitted in 04/2018 secondary to COPD exacerbation. Per hospital course:  Please see history and physical for details. Patient is a 82 y.o. female who is still a cigarette smoker and as result came to the hospital with complaints of shortness of breath and was having a COPD exacerbation.  Her COPD is becoming " quite advanced and I felt she needed a pulmonology consult for good outpatient follow-up.  Regardless at this juncture she responded to use of nebulized medications and IV steroids.  At this juncture pulmonology has transitioned steroids to oral route and even provided the prescription at discharge.  Patient does not have a nebulizer now make sure that she gets that prior to discharge.  She can continue use of her Symbicort as well as Spiriva and I will provide her additional albuterol for the nebulizer.  Respiratory viral panel was negative that both tobacco use in addition to seasonal allergies and pollen and likely exacerbated this flare-up.  Case was discussed with patient as well as multiple family members at bedside and all questions answered.  Pulmonary will provide outpatient follow-up and they are even considering use of Trilogy at the time of follow-up.  Patient artery has home oxygen which she normally uses with her sleep and pulmonology also recommends to use with exertion.               PAST MEDICAL HISTORY  Active Ambulatory Problems     Diagnosis Date Noted   • Pancolitis (CMS/[facility]) 08/24/2016   • COPD (chronic obstructive pulmonary disease) (CMS/[facility]) 08/25/2016   • Hypertension 08/25/2016   • Sepsis (CMS/[facility]) 08/25/2016   • Acute bacterial colitis 08/26/2016   • COPD exacerbation (CMS/[facility]) 04/26/2018   • Hyperlipidemia 04/26/2018   • Anxiety 04/26/2018   • Acute on chronic respiratory failure with hypercapnia (CMS/[facility]) 04/27/2018   • Toxic encephalopathy 04/27/2018     Resolved Ambulatory Problems     Diagnosis Date Noted   • No Resolved Ambulatory Problems     Past Medical History:   Diagnosis Date   • Anxiety    • Arthritis    • Cancer (CMS/[facility])    • COPD (chronic obstructive pulmonary disease) (CMS/[facility])    • Diverticulitis    • Hyperlipidemia    • Hypertension    • Osteoporosis          PAST SURGICAL HISTORY  Past Surgical History:   Procedure Laterality Date   • APPENDECTOMY     • BLADDER SURGERY       dr smith.   for bladder cancer   • COLON SURGERY      dr huang.  diverticulosis.   • HEMORRHOIDECTOMY      dr huang   • HYSTERECTOMY     • TONSILLECTOMY           FAMILY HISTORY  History reviewed. No pertinent family history.      SOCIAL HISTORY  Social History     Social History   • Marital status:      Spouse name: N/A   • Number of children: N/A   • Years of education: N/A     Occupational History   • Not on file.     Social History Main Topics   • Smoking status: Current Every Day Smoker     Packs/day: 1.00     Years: 61.00     Types: Cigarettes   • Smokeless tobacco: Not on file   • Alcohol use No   • Drug use: No   • Sexual activity: Defer     Other Topics Concern   • Not on file     Social History Narrative   • No narrative on file         ALLERGIES  Adhesive tape and Latex        REVIEW OF SYSTEMS  Review of Systems   Constitutional: Positive for appetite change (decreased appetite/PO intake). Negative for chills.   HENT: Negative for congestion, rhinorrhea and sore throat.    Eyes: Negative for pain and visual disturbance.   Respiratory: Positive for shortness of breath. Negative for cough.    Cardiovascular: Positive for chest pain (left chest wall pain). Negative for palpitations and leg swelling.   Gastrointestinal: Negative for nausea and vomiting.   Genitourinary: Negative for difficulty urinating, dysuria, flank pain and frequency.   Musculoskeletal: Negative for neck pain and neck stiffness.        Head injury   Skin: Negative for rash.   Neurological: Positive for weakness (generalized; not focal). Negative for dizziness, syncope, speech difficulty, light-headedness, numbness and headaches.   Psychiatric/Behavioral: Negative.    All other systems reviewed and are negative.           PHYSICAL EXAM  ED Triage Vitals [07/10/18 1019]   Temp Heart Rate Resp BP SpO2   98.9 °F (37.2 °C) 60 16 103/74 94 %      Temp src Heart Rate Source Patient Position BP Location FiO2 (%)   Oral -- --  -- --       Physical Exam   Constitutional: She is oriented to person, place, and time. No distress.   HENT:   Mouth/Throat: Mucous membranes are dry.   Eyes: EOM are normal. Pupils are equal, round, and reactive to light.   Pale conjunctiva.    Neck: Normal range of motion. Neck supple.   Cardiovascular: Normal rate, regular rhythm and normal heart sounds.    Pulmonary/Chest: Effort normal. No respiratory distress. She has no decreased breath sounds. She has wheezes (mild) in the right lower field and the left lower field. She has no rhonchi. She has no rales. She exhibits tenderness (of the left lower chest wall).   Abdominal: Soft. There is tenderness (left-sided). There is guarding. There is no rebound.   Musculoskeletal: Normal range of motion. She exhibits no edema (no BLE edema).   No C-Spine tenderness.   Neurological: She is alert and oriented to person, place, and time. She has normal sensation.   Skin: Skin is warm. Abrasion (with contusion to the left forehead) noted.   Psychiatric: Mood and affect normal.   Nursing note and vitals reviewed.          LAB RESULTS  Recent Results (from the past 24 hour(s))   Comprehensive Metabolic Panel    Collection Time: 07/10/18 10:41 AM   Result Value Ref Range    Glucose 87 65 - 99 mg/dL    BUN 15 8 - 23 mg/dL    Creatinine 0.76 0.57 - 1.00 mg/dL    Sodium 144 136 - 145 mmol/L    Potassium 4.2 3.5 - 5.2 mmol/L    Chloride 101 98 - 107 mmol/L    CO2 31.9 (H) 22.0 - 29.0 mmol/L    Calcium 9.8 8.6 - 10.5 mg/dL    Total Protein 6.6 6.0 - 8.5 g/dL    Albumin 3.90 3.50 - 5.20 g/dL    ALT (SGPT) 8 1 - 33 U/L    AST (SGOT) 12 1 - 32 U/L    Alkaline Phosphatase 72 39 - 117 U/L    Total Bilirubin 0.6 0.1 - 1.2 mg/dL    eGFR Non African Amer 73 >60 mL/min/1.73    Globulin 2.7 gm/dL    A/G Ratio 1.4 g/dL    BUN/Creatinine Ratio 19.7 7.0 - 25.0    Anion Gap 11.1 mmol/L   Lipase    Collection Time: 07/10/18 10:41 AM   Result Value Ref Range    Lipase 14 13 - 60 U/L   Troponin     Collection Time: 07/10/18 10:41 AM   Result Value Ref Range    Troponin T <0.010 0.000 - 0.030 ng/mL   CBC Auto Differential    Collection Time: 07/10/18 10:41 AM   Result Value Ref Range    WBC 8.11 4.50 - 10.70 10*3/mm3    RBC 4.05 3.90 - 5.20 10*6/mm3    Hemoglobin 12.7 11.9 - 15.5 g/dL    Hematocrit 40.1 35.6 - 45.5 %    MCV 99.0 (H) 80.5 - 98.2 fL    MCH 31.4 26.9 - 32.0 pg    MCHC 31.7 (L) 32.4 - 36.3 g/dL    RDW 12.5 11.7 - 13.0 %    RDW-SD 45.5 37.0 - 54.0 fl    MPV 10.6 6.0 - 12.0 fL    Platelets 196 140 - 500 10*3/mm3    Neutrophil % 71.6 42.7 - 76.0 %    Lymphocyte % 14.7 (L) 19.6 - 45.3 %    Monocyte % 11.3 5.0 - 12.0 %    Eosinophil % 2.3 0.3 - 6.2 %    Basophil % 0.1 0.0 - 1.5 %    Immature Grans % 0.0 0.0 - 0.5 %    Neutrophils, Absolute 5.80 1.90 - 8.10 10*3/mm3    Lymphocytes, Absolute 1.19 0.90 - 4.80 10*3/mm3    Monocytes, Absolute 0.92 0.20 - 1.20 10*3/mm3    Eosinophils, Absolute 0.19 0.00 - 0.70 10*3/mm3    Basophils, Absolute 0.01 0.00 - 0.20 10*3/mm3    Immature Grans, Absolute 0.00 0.00 - 0.03 10*3/mm3   T4, Free    Collection Time: 07/10/18 10:41 AM   Result Value Ref Range    Free T4 0.90 (L) 0.93 - 1.70 ng/dL   TSH    Collection Time: 07/10/18 10:41 AM   Result Value Ref Range    TSH 1.540 0.270 - 4.200 mIU/mL   Magnesium    Collection Time: 07/10/18 10:41 AM   Result Value Ref Range    Magnesium 2.1 1.6 - 2.4 mg/dL   Urinalysis With Microscopic If Indicated (No Culture) - Urine, Clean Catch    Collection Time: 07/10/18 10:45 AM   Result Value Ref Range    Color, UA Yellow Yellow, Straw    Appearance, UA Clear Clear    pH, UA 6.5 5.0 - 8.0    Specific Gravity, UA 1.018 1.005 - 1.030    Glucose, UA Negative Negative    Ketones, UA Negative Negative    Bilirubin, UA Negative Negative    Blood, UA Negative Negative    Protein, UA Trace (A) Negative    Leuk Esterase, UA Small (1+) (A) Negative    Nitrite, UA Negative Negative    Urobilinogen, UA 1.0 E.U./dL 0.2 - 1.0 E.U./dL   Urinalysis,  Microscopic Only - Urine, Clean Catch    Collection Time: 07/10/18 10:45 AM   Result Value Ref Range    RBC, UA None Seen None Seen, 0-2 /HPF    WBC, UA 3-5 (A) None Seen, 0-2 /HPF    Bacteria, UA 1+ (A) None Seen /HPF    Squamous Epithelial Cells, UA 0-2 None Seen, 0-2 /HPF    Yeast, UA Small/1+ Budding Yeast None Seen /HPF    Hyaline Casts, UA None Seen None Seen /LPF    Methodology Manual Light Microscopy    Blood Gas, Arterial    Collection Time: 07/10/18 12:40 PM   Result Value Ref Range    Site Arterial: right brachial     Juan José's Test N/A     pH, Arterial 7.432 7.350 - 7.450 pH units    pCO2, Arterial 46.6 (H) 35.0 - 45.0 mm Hg    pO2, Arterial 94.9 80.0 - 100.0 mm Hg    HCO3, Arterial 31.0 (H) 22.0 - 28.0 mmol/L    Base Excess, Arterial 5.8 (H) 0.0 - 2.0 mmol/L    O2 Saturation Calculated 97.5 92.0 - 99.0 %    Barometric Pressure for Blood Gas 752.0 mmHg    Modality Cannula     Flow Rate 2 lpm    Rate 18 Breaths/minute       Ordered the above labs and reviewed the results.        RADIOLOGY  CT Chest Without Contrast   Final Result   Impression:   1.  Findings most suggestive of acute diverticulitis involving the   ascending colon. A small rim-enhancing pericolonic fluid collection   contiguous with the vaginal cuff is concerning for a small abscess.   2.  Mild thickening of the descending colon which appears similar to   that seen on 8/24/2016. Findings concerning for mild coexisting colitis.   3.  1.6 cm well-circumscribed nodule within the right breast.   Correlation with mammography is recommended to exclude underlying   neoplasm.   4.  Right nephrolithiasis.   5.  Severe emphysema.       This report was finalized on 7/10/2018 1:49 PM by Dr. Dipak Duong M.D.          CT Abdomen Pelvis With Contrast   Final Result   Impression:   1.  Findings most suggestive of acute diverticulitis involving the   ascending colon. A small rim-enhancing pericolonic fluid collection   contiguous with the vaginal cuff is  concerning for a small abscess.   2.  Mild thickening of the descending colon which appears similar to   that seen on 8/24/2016. Findings concerning for mild coexisting colitis.   3.  1.6 cm well-circumscribed nodule within the right breast.   Correlation with mammography is recommended to exclude underlying   neoplasm.   4.  Right nephrolithiasis.   5.  Severe emphysema.       This report was finalized on 7/10/2018 1:49 PM by Dr. Dipak Duong M.D.          CT Head Without Contrast   Preliminary Result       1. No acute abnormality is seen. 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       2. The remainder of the head CT is normal.        Radiation dose reduction techniques were utilized, including automated   exposure control and exposure modulation based on body size.              XR Ribs Left With PA Chest     There is diffuse osseous demineralization. There is a  nonsignificantly displaced fracture at the anterior aspect of the left  9th rib. No other definite acute left-sided rib fractures are seen.  There is no pneumothorax. There are no pulmonary airspace consolidations  and there is no evidence for effusions or CHF.             Ordered the above noted radiological studies. Reviewed by me in PACS.          PROCEDURES  Procedures        EKG    EKG time: 11:45 AM  Rhythm/Rate: Sinus bradycardia rate 58  No Acute Ischemia  Non-Specific ST-T changes    Unchanged compared to prior on 04/2018    Interpreted Contemporaneously by me.  Independently viewed by me            MEDICATIONS GIVEN IN ER  Medications   metroNIDAZOLE (FLAGYL) IVPB 500 mg (500 mg Intravenous New Bag 7/10/18 1502)   levoFLOXacin (LEVAQUIN) 500 mg/100 mL D5W  (premix) (LEVAQUIN) 500 mg (not administered)   ipratropium-albuterol (DUO-NEB) nebulizer solution 3 mL (3 mL Nebulization Given 7/10/18 1244)   methylPREDNISolone sodium succinate (SOLU-Medrol) injection 125 mg (125 mg Intravenous Given 7/10/18 1217)   sodium chloride 0.9 % bolus 1,000 mL (0 mL Intravenous Stopped 7/10/18 1358)   iopamidol (ISOVUE-300) 61 % injection 100 mL (85 mL Intravenous Given 7/10/18 1257)               PROGRESS AND CONSULTS  ED Course as of Jul 10 1516   Tue Jul 10, 2018   1035 C/o gradually worsening weakness and fatigue.  Symptoms became much worse Saturday after she fell out of chair while asleep.  Pt hit her head- left temporal area and has left rib pain due to the fall.  Pt starts she has no appetite and hasnt eaten much in the past couple days.    Exam:  Contusion over left temporal, no spinal tenderness.  Tenderness over left ribs.    [KG]      ED Course User Index  [KG] Nasreen Gray, APRN     12:00 PM:  Informed pt and family that pt's left rib X-Ray shows that there is a nonsignificantly displaced fracture at the anterior aspect of the left  9th rib. Will order a CT Chest and CT Abd for further evaluation. Pt and family agree with plan.     12:05 PM:  ABG, free T4/TSH, CT Abd, and CT Chest ordered for further evaluation. Duo-neb ordered to improve breathing. Solumedrol ordered to decrease inflammation. IV fluids ordered to hydrate pt.     1:58 PM:  I attempted to contact the radiologist without success.     2:01 PM:  Rechecked pt. Informed pt and family that pt's CT Abd shows acute diverticulitis. There is also a small rim-enhancing pericolonic fluid collection  contiguous with the vaginal cuff which may be a small abscess. Pt's Hgb is 12.7. Troponin is negative. Pt also has a rib fracture present. Need for pt's admission to the hospital for further evaluation and treatment. Pt agrees with plan. Decision time to admit: Now.     2:05 PM:  Placed call to [facility] for admission.      2:26 PM:  Discussed case with Dr. He, hospitalist. She will admit pt to a telemetry bed. She was made aware of pt's possible abscess visualized on the CT Abd. She would like pt administered Levaquin and Flagyl to treat for pt's diverticulitis.      2:53 PM:  Dr. He is at pt's bedside evaluating the pt. Levaquin and Flagyl have been ordered to treat for diverticulitis.          MEDICAL DECISION MAKING      MDM  Number of Diagnoses or Management Options  Acute diverticulitis:   Closed fracture of one rib of left side, initial encounter:   Fall, initial encounter:   Generalized weakness:   Possible abscess:      Amount and/or Complexity of Data Reviewed  Clinical lab tests: ordered and reviewed (UA results reviewed. Troponin is negative.)  Tests in the radiology section of CPT®: ordered and reviewed (Left rib X-Ray:  There is diffuse osseous demineralization. There is a  nonsignificantly displaced fracture at the anterior aspect of the left  9th rib. No other definite acute left-sided rib fractures are seen.  There is no pneumothorax. There are no pulmonary airspace consolidations  and there is no evidence for effusions or CHF.)  Tests in the medicine section of CPT®: reviewed and ordered (EKG interpreted.   )  Decide to obtain previous medical records or to obtain history from someone other than the patient: yes  Discuss the patient with other providers: yes (Discussed the case with Dr. He, hospitalist.  )    Patient Progress  Patient progress: stable             DIAGNOSIS  Final diagnoses:   Fall, initial encounter   Closed fracture of one rib of left side, initial encounter   Acute diverticulitis   Generalized weakness   Possible abscess         DISPOSITION  Pt admitted to telemetry.      ADMISSION    Discussed treatment plan and reason for admission with pt/family and admitting physician.  Pt/family voiced understanding of the plan for admission for further testing/treatment as needed.                  Latest Documented Vital Signs:  As of 2:30 PM  BP- 113/61 HR- 92 Temp- 98.9 °F (37.2 °C) (Oral) O2 sat- 95%        --  Documentation assistance provided by cooper Gilmore for Dr. Lucina MD.  Information recorded by the scribe was done at my direction and has been verified and validated by me.               Hiren Gilmore  07/10/18 1519       Richard Verdugo MD  07/10/18 4933

## 2018-07-10 NOTE — PLAN OF CARE
Problem: Patient Care Overview  Goal: Plan of Care Review  Outcome: Ongoing (interventions implemented as appropriate)   07/10/18 9061   Coping/Psychosocial   Plan of Care Reviewed With patient   Plan of Care Review   Progress no change   OTHER   Outcome Summary [place] [facility] [place] of being confused lately, reviewed plan of care with family     Goal: Individualization and Mutuality  Outcome: Ongoing (interventions implemented as appropriate)    Goal: Discharge Needs Assessment  Outcome: Ongoing (interventions implemented as appropriate)    Goal: Interprofessional Rounds/Family Conf  Outcome: Ongoing (interventions implemented as appropriate)      Problem: Skin Injury Risk (Adult)  Goal: Identify Related Risk Factors and Signs and Symptoms  Outcome: Ongoing (interventions implemented as appropriate)    Goal: Skin Health and Integrity  Outcome: Ongoing (interventions implemented as appropriate)

## 2018-07-11 PROBLEM — J96.11 CHRONIC RESPIRATORY FAILURE WITH HYPOXIA (HCC): Status: ACTIVE | Noted: 2018-01-01

## 2018-07-11 PROBLEM — N63.0 BREAST NODULE: Status: ACTIVE | Noted: 2018-01-01

## 2018-07-11 PROBLEM — J44.9 COPD (CHRONIC OBSTRUCTIVE PULMONARY DISEASE) (HCC): Status: ACTIVE | Noted: 2018-01-01

## 2018-07-11 PROBLEM — R41.3 MEMORY LOSS: Status: ACTIVE | Noted: 2018-01-01

## 2018-07-11 NOTE — PLAN OF CARE
Problem: Patient Care Overview  Goal: Plan of Care Review  Outcome: Ongoing (interventions implemented as appropriate)   07/11/18 9827   Coping/Psychosocial   Plan of Care Reviewed With patient   OTHER   Outcome Summary Pt presents wtih some impaired functional mobility and gait secondary to generalized weakness, L rib pain, and decreased activitty tolerance s/p fall with rib fracture. Pt may benefit from skilled PT to address strength, mobility, and gait.

## 2018-07-11 NOTE — PROGRESS NOTES
Name: Lauren Monreal ADMIT: 7/10/2018   : 1935  PCP: Héctor Abbott MD    MRN: [id number] LOS: 1 days   AGE/SEX: 82 y.o. female  ROOM: [facility]/   Subjective     abd pain since she was 13  Intermittent diarrhea is chronic  Only has left side rib pain, denies any change in her chronic abdominal pain  Falls frequently and chronic issue    Objective   Vital Signs  Temp:  [97.3 °F (36.3 °C)-98.9 °F (37.2 °C)] 97.8 °F (36.6 °C)  Heart Rate:  [] 65  Resp:  [15-20] 16  BP: (102-139)/(56-74) 107/69  SpO2:  [93 %-99 %] 94 %  on  Flow (L/min):  [2] 2;   Device (Oxygen Therapy): nasal cannula  Body mass index is 20.82 kg/m².    Physical Exam   Constitutional: No distress.   Elderly and frail appearing     HENT:   Head: Normocephalic and atraumatic.   Neck: No JVD present.   Cardiovascular: Normal rate and regular rhythm.  Exam reveals no friction rub.    No murmur heard.  Pulmonary/Chest: Effort normal and breath sounds normal. No stridor. No respiratory distress. She has no wheezes.   Mildly prolonged expiration     Abdominal: Soft. Bowel sounds are normal. She exhibits no distension. There is no tenderness. There is no guarding.   Genitourinary:   Genitourinary Comments: Small palpable right breast nodule about 11 o'clock   Musculoskeletal: She exhibits no edema or tenderness.   Neurological: She is alert.   Skin: She is not diaphoretic. No erythema. No pallor.   Psychiatric: She has a normal mood and affect. Her behavior is normal.   Vitals reviewed.      Results Review:       I reviewed the patient's new clinical results.    Results from last 7 days  Lab Units 18  0303 07/10/18  1041   WBC 10*3/mm3 5.18 8.11   HEMOGLOBIN g/dL 11.0* 12.7   PLATELETS 10*3/mm3 191 196     Results from last 7 days  Lab Units 18  0303 07/10/18  1041   SODIUM mmol/L 142 144   POTASSIUM mmol/L 4.0 4.2   CHLORIDE mmol/L 104 101   CO2 mmol/L 25.8 31.9*   BUN mg/dL 21 15   CREATININE mg/dL 0.74 0.76   GLUCOSE mg/dL 166*  87   Estimated Creatinine Clearance: 42.1 mL/min (by C-G formula based on SCr of 0.74 mg/dL).  Results from last 7 days  Lab Units 07/11/18  0303 07/10/18  1041   CALCIUM mg/dL 8.4* 9.8   ALBUMIN g/dL  --  3.90   MAGNESIUM mg/dL  --  2.1         budesonide-formoterol 2 puff Inhalation BID - RT   cholecalciferol 1,500 Units Oral Daily   enoxaparin 40 mg Subcutaneous Q24H   escitalopram 10 mg Oral Daily   guaiFENesin 1,200 mg Oral Q12H   ipratropium-albuterol 3 mL Nebulization Q4H - RT   lactobacillus acidophilus 1 capsule Oral Daily   [START ON 7/12/2018] levoFLOXacin 750 mg Intravenous Q48H   lidocaine 1 patch Transdermal Q24H   linaclotide 290 mcg Oral Daily   methylPREDNISolone sodium succinate 80 mg Intravenous Q6H   metroNIDAZOLE 500 mg Intravenous Q8H   nicotine 1 patch Transdermal Q24H       sodium chloride 0.9 % with KCl 20 mEq 75 mL/hr Last Rate: 75 mL/hr (07/11/18 0935)   Diet Clear Liquid      Assessment/Plan      Active Hospital Problems    Diagnosis Date Noted   • Chronic respiratory failure with hypoxia (CMS/HCC) [J96.11] 07/11/2018   • Memory loss [R41.3] 07/11/2018   • Breast nodule [N63.0] 07/11/2018   • Fall [W19.XXXA] 07/10/2018   • Diverticulitis of large intestine with abscess without bleeding [K57.20] 07/10/2018   • Closed fracture of one rib of left side [S22.32XA] 07/10/2018   • Tobacco abuse [Z72.0] 07/10/2018   • Narcolepsy [G47.419] 07/10/2018   • COPD (chronic obstructive pulmonary disease) (CMS/HCC) [J44.9] 04/26/2018   • Hypertension [I10] 08/25/2016      Resolved Hospital Problems    Diagnosis Date Noted Date Resolved   No resolved problems to display.       Diverticulitis of large intestine with abscess without bleeding  -Consulted colorectal surgery given rim-enhancing pericolic fluid and possible abscess   -IV levoquin, and iv flagyl  -IV fluids and holding Lasix, decrease IV fluid rate  -Pt states she doesn't want any surgery     COPD with chronic respiratory failure with hypoxia  (2L)  -Doesn't appear to be in exacerbation  -Stop steroids  -continue  - scheduled Duonebs, Albuterol  prn, Incentive Spirometry  -iv levo  -02 to keeps sat >88%     Narcolepsy and falls  -Family make it sound like this is a repeated issue and she has bruising and fracture.    -t neurology to see   - PT and OT     Closed fracture of one rib of left side  -Likely causing some of her COPD issues.  We will encourage incentive spirometer and place Lidoderm patch as well as when necessary IV pain medication      Hypertension   -Stable continue medical management      - Holding Lasix and potassium due to decreased oral intake due to abdominal pain     Right breast nodule  -pt doesn't want mammogram since it won't change her decision of no treatment whatever the nodule is.    Tobacco abuse= NicoDerm      DVT proph: Lovenox 40    D/W ANNIE Berry MD  [facility]  07/11/18  9:43 AM

## 2018-07-11 NOTE — CONSULTS
Neurology Consult Note    Consult Date: 7/11/2018    Referring MD: Ana He,*    Reason for Consult I have been asked to see the patient in neurological consultation to render advice and opinion regarding possible narcolepsy with recurrent episodes of falling out of a chair.    Lauren Monreal is a 82 y.o. female with COPD admitted here for diverticulitis.  She originally presented after falling asleep and falling out of her chair and having left-sided rib pain.  Family members report that she was diagnosed with narcolepsy approximately 30 years ago.  She has a long history of recurrent sleep attacks.  She frequently falls asleep while sitting in a chair.  Her sleep attacks are provoked by meals or really anything that gets her relaxed.  Family members report that this happens daily or sometimes multiple times per day.  Most recently as mentioned in other notes she fell out of her chair and fractured a rib and hit her forehead.  She reports narcolepsy was diagnosed based on a sleep study but cannot tell me who she saw for this.  The patient and multiple family members agree that she's never been on medication for narcolepsy.  Of note she has dementia and is disoriented at baseline with very poor memory.  She has frequent hallucinations according to family members.    Past medical history is significant for dementia and COPD    Social history she was with her  and smokes cigarettes    Past Medical/Surgical Hx:  Past Medical History:   Diagnosis Date   • Anxiety    • Arthritis    • Cancer (CMS/HCC)     bladder.  sees dr smith   • COPD (chronic obstructive pulmonary disease) (CMS/[facility])    • Diverticulitis    • Hyperlipidemia    • Hypertension    • Osteoporosis      Past Surgical History:   Procedure Laterality Date   • APPENDECTOMY     • BLADDER SURGERY      dr smith.   for bladder cancer   • COLON SURGERY      dr huang.  diverticulosis.   • HEMORRHOIDECTOMY      dr huang   •  HYSTERECTOMY     • TONSILLECTOMY         Medications On Admission  Prescriptions Prior to Admission   Medication Sig Dispense Refill Last Dose   • acetaminophen (TYLENOL) 650 MG 8 hr tablet Take 325 mg by mouth Every 6 (Six) Hours As Needed for Mild Pain .      • albuterol (PROAIR HFA) 108 (90 Base) MCG/ACT inhaler Inhale 1-2 puffs Every 4 (Four) Hours As Needed for Wheezing.   4/26/2018 at Unknown time   • albuterol (PROVENTIL) (2.5 MG/3ML) 0.083% nebulizer solution Take 2.5 mg by nebulization Every 4 (Four) Hours As Needed for Wheezing. 50 vial 0 7/9/2018 at Unknown time   • budesonide-formoterol (SYMBICORT) 160-4.5 MCG/ACT inhaler Inhale 1-2 puffs 2 (Two) Times a Day.   7/9/2018 at Unknown time   • cholecalciferol (VITAMIN D3) 1000 UNITS tablet Take 1,500 Units by mouth daily.      • docusate sodium (COLACE) 100 MG capsule Take 100 mg by mouth 2 (Two) Times a Day As Needed.   7/9/2018 at Unknown time   • escitalopram (LEXAPRO) 5 MG tablet Take 10 mg by mouth Daily.   7/9/2018 at Unknown time   • furosemide (LASIX) 40 MG tablet Take 40 mg by mouth Daily.   7/9/2018 at Unknown time   • lactobacillus acidophilus (RISAQUAD) capsule capsule Take 1 capsule by mouth daily. 30 capsule 0    • linaclotide (LINZESS) 290 MCG capsule capsule Take 72 mcg by mouth Daily.   7/9/2018 at Unknown time   • loperamide (IMODIUM) 2 MG capsule Take 2 mg by mouth 4 (Four) Times a Day As Needed for Diarrhea.      • loratadine-pseudoephedrine (CLARITIN-D 24 HOUR)  MG per 24 hr tablet Take 1 tablet by mouth Daily As Needed for Allergies.      • potassium chloride (MICRO-K) 10 MEQ CR capsule Take 10 mEq by mouth 2 (Two) Times a Day.   7/9/2018 at Unknown time   • simvastatin (ZOCOR) 10 MG tablet Take 10 mg by mouth every night.   7/9/2018 at Unknown time   • tiotropium (SPIRIVA) 18 MCG per inhalation capsule Place 1 capsule into inhaler and inhale Daily.   7/9/2018 at Unknown time   • predniSONE (DELTASONE) 10 MG tablet Take 4 tabs  "daily x 3 days, then take 3 tabs daily x 3 days, then take 2 tabs daily x 3 days, then take 1 tab daily x 3 days 31 tablet 0        Allergies:  Allergies   Allergen Reactions   • Adhesive Tape    • Latex Itching       Social Hx:  Social History     Social History   • Marital status:      Spouse name: N/A   • Number of children: N/A   • Years of education: N/A     Occupational History   • Not on file.     Social History Main Topics   • Smoking status: Current Every Day Smoker     Packs/day: 1.00     Years: 61.00     Types: Cigarettes   • Smokeless tobacco: Not on file   • Alcohol use No   • Drug use: No   • Sexual activity: Defer     Other Topics Concern   • Not on file     Social History Narrative   • No narrative on file       ROS: no weakness, numbness. + right eye vision loss (chronic), + difficulty walking, + generalized weakness, + fatigue    Exam    /69 (BP Location: Left arm, Patient Position: Lying)   Pulse 74   Temp 97.8 °F (36.6 °C) (Oral)   Resp 16   Ht 152.4 cm (60\")   Wt 48.4 kg (106 lb 9.6 oz)   SpO2 92%   BMI 20.82 kg/m²   gen: NAD, vitals reviewed  MS: oriented x2, recent/remote memory impaired, impaired attention/concentration, language intact, no neglect, normal fund of knowledge  CN: Severely limited vision right eye, visual fields full left eye, EOMI, facial sensation equal, no facial droop, hearing symmetric, no dysarthria, shoulder shrug equal, tongue midline  Motor: 5/5 throughout upper and lower extremities, normal tone  Sensation: intact to light touch throughout  Reflexes: 2+ throughout upper and lower extremities, downgoing plantars  Coordination: no dysmetria with finger to nose bilaterally    DATA:    Lab Results   Component Value Date    GLUCOSE 166 (H) 07/11/2018    CALCIUM 8.4 (L) 07/11/2018     07/11/2018    K 4.0 07/11/2018    CO2 25.8 07/11/2018     07/11/2018    BUN 21 07/11/2018    CREATININE 0.74 07/11/2018    EGFRIFAFRI  08/26/2016      Comment: "      <15 Indicative of kidney failure.    EGFRIFNONA 75 07/11/2018    BCR 28.4 (H) 07/11/2018    ANIONGAP 12.2 07/11/2018     Lab Results   Component Value Date    WBC 5.18 07/11/2018    HGB 11.0 (L) 07/11/2018    HCT 34.4 (L) 07/11/2018    MCV 97.5 07/11/2018     07/11/2018       Lab Results   Component Value Date    INR 0.95 04/26/2018    INR 0.96 08/24/2016    PROTIME 12.5 04/26/2018    PROTIME 12.5 08/24/2016     CBC shows mild anemia, BMP shows hyperglycemia, low calcium    Imaging review: I personally reviewed her CT head which shows severe small vessel disease and a chronic cerebellar infarct.  Radiology report reviewed    Impression:  1) narcolepsy with cataplexy  2) vascular dementia  3) recurrent falls    Comment: 82-year-old white female with COPD, here for diverticulitis.  She has long-standing narcolepsy with cataplexy, untreated.  I do not recommend starting her on any stimulants for narcolepsy because I feel this will almost certainly exacerbate her advanced dementia.  I discussed her treatment options with family members who were present and they agreed with the plan, also preferring not to add any medication for this at this time.    PLAN:  1.  Would not recommend starting any stimulant medications at this time despite her frequent sleep attacks because of risk of exacerbating her dementia.  2. If desired she could be tried on a low dose extended release ritalin as an outpatient but family are not interested in this currently.    We'll sign off for now. Please call us back with any questions.

## 2018-07-11 NOTE — PLAN OF CARE
Problem: Patient Care Overview  Goal: Plan of Care Review  Outcome: Ongoing (interventions implemented as appropriate)   07/11/18 8454   Coping/Psychosocial   Plan of Care Reviewed With patient   Plan of Care Review   Progress improving   OTHER   Outcome Summary VSS; neuro and colo-rectal sx seen, see recs; IV abx continue; diet adv to full liquids; PT/OT eval for rehab; pt seems confused to conversation but answers orientation questions appropriately; no c/o pain, just sore around rib cage where fx is; IVF continue; will continue to monitor     Goal: Individualization and Mutuality  Outcome: Ongoing (interventions implemented as appropriate)    Goal: Discharge Needs Assessment  Outcome: Ongoing (interventions implemented as appropriate)    Goal: Interprofessional Rounds/Family Conf  Outcome: Ongoing (interventions implemented as appropriate)      Problem: Skin Injury Risk (Adult)  Goal: Identify Related Risk Factors and Signs and Symptoms  Outcome: Ongoing (interventions implemented as appropriate)    Goal: Skin Health and Integrity  Outcome: Ongoing (interventions implemented as appropriate)

## 2018-07-11 NOTE — THERAPY EVALUATION
Acute Care - Physical Therapy Initial Evaluation  [facility]     Patient Name: Lauren Monreal  : 1935  MRN: [id number]  Today's Date: 2018   Onset of Illness/Injury or Date of Surgery: 07/10/18            Admit Date: 7/10/2018    Visit Dx:     ICD-10-CM ICD-9-CM   1. Fall, initial encounter W19.XXXA E888.9   2. Closed fracture of one rib of left side, initial encounter S22.32XA 807.01   3. Acute diverticulitis K57.92 562.11   4. Generalized weakness R53.1 780.79   5. Possible abscess L02.91 682.9     Patient Active Problem List   Diagnosis   • Pancolitis (CMS/HCC)   • COPD (chronic obstructive pulmonary disease) (CMS/HCC)   • Hypertension   • Sepsis (CMS/HCC)   • Acute bacterial colitis   • COPD (chronic obstructive pulmonary disease) (CMS/HCC)   • Hyperlipidemia   • Anxiety   • Acute on chronic respiratory failure with hypercapnia (CMS/HCC)   • Toxic encephalopathy   • Fall   • Diverticulitis of large intestine with abscess without bleeding   • Closed fracture of one rib of left side   • Tobacco abuse   • Narcolepsy   • Chronic respiratory failure with hypoxia (CMS/HCC)   • Memory loss   • Breast nodule     Past Medical History:   Diagnosis Date   • Anxiety    • Arthritis    • Cancer (CMS/HCC)     bladder.  sees dr smith   • COPD (chronic obstructive pulmonary disease) (CMS/HCC)    • Diverticulitis    • Hyperlipidemia    • Hypertension    • Osteoporosis      Past Surgical History:   Procedure Laterality Date   • APPENDECTOMY     • BLADDER SURGERY      dr smith.   for bladder cancer   • COLON SURGERY      dr huang.  diverticulosis.   • HEMORRHOIDECTOMY      dr huang   • HYSTERECTOMY     • TONSILLECTOMY          PT ASSESSMENT (last 12 hours)      Physical Therapy Evaluation     Row Name 18 1552          PT Evaluation Time/Intention    Subjective Information no complaints  -CH     Document Type evaluation  -CH     Mode of Treatment physical therapy  -CH     Patient Effort good  -CH      Symptoms Noted During/After Treatment none  -     Row Name 07/11/18 1552          General Information    Onset of Illness/Injury or Date of Surgery 07/10/18  -     Patient Observations alert;cooperative;agree to therapy  -     Patient/Family Observations pt supine in bed, no acute distress noted at rest  -     Prior Level of Function independent:;gait;transfer;bed mobility;ADL's   walks with rollator  -     Equipment Currently Used at Home rollator  -     Pertinent History of Current Functional Problem pt admitted with fall, SOA, and L rib fracture  -     Existing Precautions/Restrictions fall  -     Limitations/Impairments safety/cognitive;visual   pt with some memory issues  -     Barriers to Rehab medically complex  -     Row Name 07/11/18 1552          Relationship/Environment    Lives With spouse  -     Row Name 07/11/18 1552          Resource/Environmental Concerns    Current Living Arrangements home/apartment/condo  -     Row Name 07/11/18 1552          Cognitive Assessment/Interventions    Additional Documentation Cognitive Assessment/Intervention (Group)  -     Row Name 07/11/18 1552          Cognitive Assessment/Intervention- PT/OT    Orientation Status (Cognition) oriented x 4  -     Follows Commands (Cognition) WFL  -     Cognitive Function (Cognitive) WFL  -     Personal Safety Interventions fall prevention program maintained;gait belt;nonskid shoes/slippers when out of bed  -     Row Name 07/11/18 1552          Bed Mobility Assessment/Treatment    Supine-Sit [place] (Bed Mobility) supervision  -     Sit-Supine [place] (Bed Mobility) supervision  -     Row Name 07/11/18 1552          Transfer Assessment/Treatment    Transfer Assessment/Treatment sit-stand transfer;stand-sit transfer  -     Sit-Stand [place] (Transfers) verbal cues;nonverbal cues (demo/gesture);contact guard  -     Stand-Sit [place] (Transfers) verbal cues;nonverbal cues  (demo/gesture);contact guard  -[facility] Name 07/11/18 1552          Sit-Stand Transfer    Assistive Device (Sit-Stand Transfers) walker, front-wheeled  -[facility] Name 07/11/18 1552          Stand-Sit Transfer    Assistive Device (Stand-Sit Transfers) walker, front-wheeled  -CH     [facility] Name 07/11/18 1552          Gait/Stairs Assessment/Training    Gait/Stairs Assessment/Training gait/ambulation independence  -     [place] Level (Gait) verbal cues;nonverbal cues (demo/gesture);contact guard  -     Assistive Device (Gait) walker, front-wheeled  -     Distance in Feet (Gait) 35  -     Deviations/Abnormal Patterns (Gait) yoli decreased;stride length decreased  -     Bilateral Gait Deviations forward flexed posture  -     Comment (Gait/Stairs) gait distance limited by SOA  -[facility] Name 07/11/18 1552          General ROM    GENERAL ROM COMMENTS AROM WFL for age  -CH     Row Name 07/11/18 1552          General Assessment (Manual Muscle Testing)    Comment, General Manual Muscle Testing (MMT) Assessment generalized weakness noted with functional mobility  -CH     Row Name 07/11/18 1552          Motor Assessment/Intervention    Additional Documentation Balance (Group)  -CH     Row Name 07/11/18 1552          Balance    Balance static standing balance;dynamic standing balance  -CH     Row Name 07/11/18 1552          Static Standing Balance    Level of [place] (Supported Standing, Static Balance) contact guard assist  -     Assistive Device Utilized (Supported Standing, Static Balance) rolling walker  -CH     Row Name 07/11/18 1552          Dynamic Standing Balance    Level of [place], Reaches Outside Midline (Standing, Dynamic Balance) contact guard assist   rolling walker  -CH     Row Name 07/11/18 1552          Pain Assessment    Additional Documentation Pain Scale: Numbers Pre/Post-Treatment (Group)  -CH     Row Name 07/11/18 1552          Pain Scale: Numbers Pre/Post-Treatment     Pain Scale: Numbers, Post-Treatment 4/10  -     Pain Location - Side Left  -     Pain Location flank  -     Pain Intervention(s) Repositioned  -     Row Name 07/11/18 1552          Plan of Care Review    Plan of Care Reviewed With patient  -     Row Name 07/11/18 1552          Physical Therapy Clinical Impression    Patient/Family Goals Statement (PT Clinical Impression) to return to [facility]  -     Criteria for Skilled Interventions Met (PT Clinical Impression) treatment indicated  -     Impairments Found (describe specific impairments) gait, locomotion, and balance;muscle performance  -     Rehab Potential (PT Clinical Summary) good, to achieve stated therapy goals  -     Row Name 07/11/18 1552          Physical Therapy Goals    Bed Mobility Goal Selection (PT) --  -     Transfer Goal Selection (PT) transfer, PT goal 1  -     Gait Training Goal Selection (PT) gait training, PT goal 1  -     Row Name 07/11/18 1552          Transfer Goal 1 (PT)    Activity/Assistive Device (Transfer Goal 1, PT) transfers, all;walker, rolling  -     [place] Level/Cues Needed (Transfer Goal 1, PT) supervision required  -CH     Time Frame (Transfer Goal 1, PT) 1 week  -     Row Name 07/11/18 1552          Gait Training Goal 1 (PT)    Activity/Assistive Device (Gait Training Goal 1, PT) gait (walking locomotion)  -     [place] Level (Gait Training Goal 1, PT) supervision required  -     Distance (Gait Goal 1, PT) 100  -CH     Time Frame (Gait Training Goal 1, PT) 1 week  -     Row Name 07/11/18 1552          Positioning and Restraints    Pre-Treatment Position in bed  -     Post Treatment Position bed  -     In Bed supine;call light within reach;encouraged to call for assist;exit alarm on;with family/caregiver  -       User Key  (r) = Recorded By, (t) = Taken By, (c) = Cosigned By    Initials Name Provider Type    CH Soha Kiser, PT Physical Therapist          Physical Therapy  Education     Title: PT OT SLP Therapies (Active)     Topic: Physical Therapy (Active)     Point: Mobility training (Done)    Learning Progress Summary     Learner Status Readiness Method Response Comment Documented by    Patient Done Acceptance E,TB,D VU,NR   07/11/18 1601          Point: Body mechanics (Done)    Learning Progress Summary     Learner Status Readiness Method Response Comment Documented by    Patient Done Acceptance E,TB,D VU,NR   07/11/18 1601          Point: Precautions (Done)    Learning Progress Summary     Learner Status Readiness Method Response Comment Documented by    Patient Done Acceptance E,TB,D VU,NR   07/11/18 1601                      User Key     Initials Effective Dates Name Provider Type Discipline     04/03/18 -  Soha Kiser, PT Physical Therapist PT                PT Recommendation and Plan  Anticipated Discharge Disposition (PT): home with home health, skilled nursing facility  Planned Therapy Interventions (PT Eval): balance training, bed mobility training, gait training, home exercise program, patient/family education, transfer training  Therapy Frequency (PT Clinical Impression): daily  Outcome Summary/Treatment Plan (PT)  Anticipated Discharge Disposition (PT): home with home health, skilled nursing facility  Plan of Care Reviewed With: patient  Outcome Summary: Pt presents wtih some impaired functional mobility and gait secondary to generalized weakness, L rib pain, and decreased activitty tolerance s/p fall with rib fracture. Pt may benefit from skilled PT to address strength, mobility, and gait.          Outcome Measures     Row Name 07/11/18 1602 07/11/18 1500          How much help from another person do you currently need...    Turning from your back to your side while in flat bed without using bedrails? 4  -CH  --     Moving from lying on back to sitting on the side of a flat bed without bedrails? 4  -CH  --     Moving to and from a bed to a chair  (including a wheelchair)? 3  -CH  --     Standing up from a chair using your arms (e.g., wheelchair, bedside chair)? 3  -CH  --     Climbing 3-5 steps with a railing? 1  -CH  --     To walk in hospital room? 3  -CH  --     AM-PAC 6 Clicks Score 18  -CH  --        How much help from another is currently needed...    Putting on and taking off regular lower body clothing?  -- 2  -VS     Bathing (including washing, rinsing, and drying)  -- 2  -VS     Toileting (which includes using toilet bed pan or urinal)  -- 2  -VS     Putting on and taking off regular upper body clothing  -- 2  -VS     Taking care of personal grooming (such as brushing teeth)  -- 2  -VS     Eating meals  -- 3  -VS     Score  -- 13  -VS        Functional Assessment    Outcome Measure Options AM-PAC 6 Clicks Basic Mobility (PT)  - AM-PAC 6 Clicks Daily Activity (OT)  -VS       User Key  (r) = Recorded By, (t) = Taken By, (c) = Cosigned By    Initials Name Provider Type     Jannie Wisdom OTR Occupational Therapist     Soha Kiser, PT Physical Therapist           Time Calculation:         PT Charges     Row Name 07/11/18 1602             Time Calculation    Start Time 1535  -      Stop Time 1551  -      Time Calculation (min) 16 min  -      PT Received On 07/11/18  -      PT - Next Appointment 07/12/18  -      PT Goal Re-Cert Due Date 07/18/18  -         Time Calculation- PT    Total Timed Code Minutes- PT 8 minute(s)  -        User Key  (r) = Recorded By, (t) = Taken By, (c) = Cosigned By    Initials Name Provider Type     Soha Kiser, PT Physical Therapist        Therapy Suggested Charges     Code   Minutes Charges    None           Therapy Charges for Today     Code Description Service Date Service Provider Modifiers Qty    91254211541  PT EVAL MOD COMPLEXITY 2 7/11/2018 Soha Kiser, PT GP 1    56288255354 HC PT THER PROC EA 15 MIN 7/11/2018 Soha Kiser, PT GP 1          PT G-Codes  Outcome Measure  Options: AM-PAC 6 Clicks Basic Mobility (PT)      Soha Kiser, PT  7/11/2018

## 2018-07-11 NOTE — CONSULTS
Lauren Monreal is a 82 y.o. female who is seen as a consult at the request of Ana He, * for abnormal CT  Pt poor historian    HPI:    Patient presented to emergency department yesterday complaining weakness and fall 3 days prior    She also complains of abdominal pain for the last several days  Decreased appetite    Prior history colon resection for diverticulitis Dr. Eller ?2005?  Hemorrhoidectomy with Dr. Eller, about 20 years ago per pt  Surgical history also notable for transurethral resection bladder tumors 2012, 2014    She has a BM about 3 times per week  She alternates between constipation and diarrhea  When she feels constipated, she takes laxative and/or stool softener    When stools are loose, she takes imodium about twice per month, which is helpful for diarrhea  Pt c/o fecal incontinence for years when stool is loose    She is unable to quantify how often  She states occurs after she takes a laxative    She also c/o frequent diarrhea after taking linzess and then taking imodium    She does not know when her last colonoscopy was    FamHx: diverticulitis sister.  No known hx colon polyps or colon cancer    She lives at home with her     Past Medical History:   Diagnosis Date   • Anxiety    • Arthritis    • Cancer (CMS/HCC)     bladder.  sees dr smith   • COPD (chronic obstructive pulmonary disease) (CMS/HCC)    • Diverticulitis    • Hyperlipidemia    • Hypertension    • Osteoporosis        Past Surgical History:   Procedure Laterality Date   • APPENDECTOMY     • BLADDER SURGERY      dr smith.   for bladder cancer   • COLON SURGERY      dr eller.  diverticulosis.   • HEMORRHOIDECTOMY      dr eller   • HYSTERECTOMY     • TONSILLECTOMY         Social History:   reports that she has been smoking Cigarettes.  She has a 61.00 pack-year smoking history. She does not have any smokeless tobacco history on file. She reports that she does not drink alcohol or use  drugs.      Marriage status:     History reviewed. No pertinent family history.      Current Facility-Administered Medications:   •  acetaminophen (TYLENOL) tablet 650 mg, 650 mg, Oral, Q6H PRN, Ana He MD  •  albuterol (PROVENTIL) nebulizer solution 0.083% 2.5 mg/3mL, 2.5 mg, Nebulization, Q4H PRN, Ana He MD  •  bisacodyl (DULCOLAX) suppository 10 mg, 10 mg, Rectal, Daily PRN, Ana He MD  •  budesonide-formoterol (SYMBICORT) 160-4.5 MCG/ACT inhaler 2 puff, 2 puff, Inhalation, BID - RT, Ana He MD, 2 puff at 07/11/18 0735  •  calcium carbonate (TUMS) chewable tablet 500 mg (200 mg elemental), 1 tablet, Oral, BID PRN, Ana He MD  •  cholecalciferol (VITAMIN D3) tablet 1,500 Units, 1,500 Units, Oral, Daily, Ana He MD, 1,500 Units at 07/10/18 2249  •  docusate sodium (COLACE) capsule 100 mg, 100 mg, Oral, BID PRN, Ana He MD  •  enoxaparin (LOVENOX) syringe 40 mg, 40 mg, Subcutaneous, Q24H, Ana He MD, 40 mg at 07/10/18 1759  •  escitalopram (LEXAPRO) tablet 10 mg, 10 mg, Oral, Daily, Ana He MD, 10 mg at 07/10/18 2227  •  guaiFENesin (MUCINEX) 12 hr tablet 1,200 mg, 1,200 mg, Oral, Q12H, Ana He MD, 1,200 mg at 07/10/18 2227  •  HYDROmorphone (DILAUDID) injection 0.25 mg, 0.25 mg, Intravenous, Q4H PRN **AND** naloxone (NARCAN) injection 0.4 mg, 0.4 mg, Intravenous, Q5 Min PRN, Ana He MD  •  ipratropium-albuterol (DUO-NEB) nebulizer solution 3 mL, 3 mL, Nebulization, Q4H - RT, Ana He MD, 3 mL at 07/11/18 0735  •  lactobacillus acidophilus (RISAQUAD) capsule 1 capsule, 1 capsule, Oral, Daily, Ana He MD, 1 capsule at 07/10/18 2227  •  [START ON 7/12/2018] levoFLOXacin (LEVAQUIN) 750 mg/150 mL D5W (premix) (LEVAQUIN) 750 mg, 750 mg, Intravenous, Q48H, Ana He MD  •  lidocaine (LIDODERM) 5 % 1 patch, 1  patch, Transdermal, Q24H, Ana He MD, 1 patch at 07/10/18 2226  •  linaclotide (LINZESS) capsule 290 mcg, 290 mcg, Oral, Daily, Ana He MD  •  methylPREDNISolone sodium succinate (SOLU-Medrol) injection 80 mg, 80 mg, Intravenous, Q6H, Ana He MD, 80 mg at 07/11/18 0524  •  metroNIDAZOLE (FLAGYL) IVPB 500 mg, 500 mg, Intravenous, Q8H, Ana He MD, 500 mg at 07/11/18 0610  •  nicotine (NICODERM CQ) 21 MG/24HR patch 1 patch, 1 patch, Transdermal, Q24H, Ana He MD, 1 patch at 07/10/18 2226  •  ondansetron (ZOFRAN) tablet 4 mg, 4 mg, Oral, Q6H PRN **OR** ondansetron ODT (ZOFRAN-ODT) disintegrating tablet 4 mg, 4 mg, Oral, Q6H PRN **OR** ondansetron (ZOFRAN) injection 4 mg, 4 mg, Intravenous, Q6H PRN, Ana He MD  •  sodium chloride 0.9 % flush 1-10 mL, 1-10 mL, Intravenous, PRN, Ana He MD  •  sodium chloride 0.9 % with KCl 20 mEq/L infusion, 125 mL/hr, Intravenous, Continuous, Ana He MD, Last Rate: 125 mL/hr at 07/10/18 2233, 125 mL/hr at 07/10/18 2233    Allergy  Adhesive tape and Latex    Review of Systems   Constitution: Positive for decreased appetite and weakness.        Falls   HENT: Negative.    Eyes: Negative.    Respiratory: Positive for shortness of breath.    Endocrine: Negative.    Skin: Negative.    Gastrointestinal: Positive for abdominal pain and nausea. Negative for vomiting.   Neurological: Positive for disturbances in coordination and loss of balance.   Psychiatric/Behavioral: Positive for memory loss.   Allergic/Immunologic: Negative.        Vitals:    07/11/18 0735   BP:    Pulse: 65   Resp: 16   Temp:    SpO2: 94%     Body mass index is 20.82 kg/m².    Physical Exam   Constitutional: She is oriented to person, place, and time. She appears well-developed and well-nourished. No distress.   HENT:   Head: Normocephalic and atraumatic.   Nose: Nose normal.   Mouth/Throat: Oropharynx is  clear and moist.   Eyes: Conjunctivae and EOM are normal. Pupils are equal, round, and reactive to light.   Neck: Normal range of motion. No tracheal deviation present.   Pulmonary/Chest: Effort normal and breath sounds normal. No respiratory distress.   Abdominal: Soft. She exhibits no distension. There is no tenderness. There is no rebound and no guarding.   Genitourinary: No vaginal discharge found.   Musculoskeletal: She exhibits no edema or deformity.   Neurological: She is alert and oriented to person, place, and time.   Skin: Skin is warm and dry.   Psychiatric: She has a normal mood and affect. Her behavior is normal.       Review of Medical Record:  I reviewed records this admission    I personally reviewed images and report of CT:  I do not see inflammation or the colon    Assessment: recent fall.  Ct abnl    Plan:  I do not think pt has colitis.  There was no oral contrast and wall of colon does not appear thick to me.    As pt with benign abdomen, afebrile, no leokocytosis, no indication for surgery at this time.  I would recommend an elective colonoscopy after acute medical issues stabilize.  Wonder if she needs more attention than can happen at home.      Scribed for Leonie Can MD by Estela Wynn PA-C. 7/11/2018    This patient was evaluated by me, recommendations made, documentation reviewed, edited, and revised by me, Leonie Can MD

## 2018-07-11 NOTE — PLAN OF CARE
Problem: Fall Risk (Adult)  Goal: Identify Related Risk Factors and Signs and Symptoms  Outcome: Ongoing (interventions implemented as appropriate)    Goal: Absence of Fall  Outcome: Ongoing (interventions implemented as appropriate)      Problem: Patient Care Overview  Goal: Plan of Care Review  Outcome: Ongoing (interventions implemented as appropriate)   07/11/18 0454   Coping/Psychosocial   Plan of Care Reviewed With patient   Plan of Care Review   Progress no change   OTHER   Outcome Summary VSS; lidocaine patch for rib pain; pt is confused and forgetful at times; no other acute changes; will continue to monitor.     Goal: Individualization and Mutuality  Outcome: Ongoing (interventions implemented as appropriate)    Goal: Discharge Needs Assessment  Outcome: Ongoing (interventions implemented as appropriate)    Goal: Interprofessional Rounds/Family Conf  Outcome: Ongoing (interventions implemented as appropriate)      Problem: Skin Injury Risk (Adult)  Goal: Identify Related Risk Factors and Signs and Symptoms  Outcome: Ongoing (interventions implemented as appropriate)    Goal: Skin Health and Integrity  Outcome: Ongoing (interventions implemented as appropriate)

## 2018-07-11 NOTE — PLAN OF CARE
Problem: Patient Care Overview  Goal: Plan of Care Review   07/11/18 1508   OTHER   Outcome Summary OT- PT. presents with decreased endurance, ADL sand transfers. Pt. will benefit for skilled OT to increase all skills

## 2018-07-11 NOTE — PROGRESS NOTES
Discharge Planning Assessment  [facility]     Patient Name: Lauren Monreal  MRN: [id number]  Today's Date: 7/11/2018    Admit Date: 7/10/2018          Discharge Needs Assessment     Row Name 07/11/18 1220       Living Environment    Lives With spouse    Current Living Arrangements home/apartment/condo    Primary Care Provided by spouse/significant other;child(lucas)    Pepito [phone] and son [phone]    Family Caregiver if Needed child(lucas), adult   son Pepito lives close by and helps when needed [phone] neice in room takes pt to apts    Quality of Family Relationships supportive    Able to Return to Prior Arrangements other (see comments)   may need rehab       Transition Planning    Patient/Family Anticipates Transition to other (see comments)   undetermined    Patient/Family Anticipated Services at Transition none    Transportation Anticipated family or friend will provide       Discharge Needs Assessment    Readmission Within the Last 30 Days no previous admission in last 30 days    Concerns to be Addressed discharge planning    Equipment Currently Used at Home oxygen;cane, straight;shower chair;wheelchair;lift device   all from [place], pt had trilogy machine but would not use it and sent it back. O2 supposed  to be continuouis but only uses at night    Anticipated Changes Related to Illness none    Equipment Needed After Discharge none    Outpatient/Agency/Support Group Needs meal delivery service   states they have meals on wheels            Discharge Plan     Row Name 07/11/18 5088       Plan    Plan undetermined    Patient/Family in Agreement with Plan yes   spouse Eliseo at bedside [phone]    Plan Comments Imm verified. Met at bedside with pt, her spouse Eliseo [phone] and her niece all whom pt gave permission to speak in front of. Per pt she lives at home with her . She has O2 that she is supposed to wear all the time but only wears at night because she is afraid she will trip over  tubing. She did have Trilogy machine but sent it back because she does not like it. Pt also still smokes. Pt has a cane, wheelchair and chair lift. Per pt she has never used home health or been to rehab. Pt has fallen several times per family, sleeps a lot. Her  has been her primary caregiver but states he doesn't think he can do it anymore. He states he has been in discussion with his son about long term plans. They are considering moving in with him or going to a facility. Discussed with all levels of care. Discussed probable need for short term rehab for pt and eventual PC for the both. Pt does not want to go to a facility. PT/OT are ordered and explained need to come up with a safe DC plan. Given a road to recovery and list by area. Discussed PC agencies to come into the home. They wish to discuss with son and wait until seen by PT. Per both they do not have a living will or POA. Given brochure and explained.  Verified demographics and pharmacy. Per pt they have no problems affording or obtaining medications. Plan at this time is undetermined will follow up with pt and her  tomorrow....[facility]        Destination     No service coordination in this encounter.      Durable Medical Equipment     No service coordination in this encounter.      Dialysis/Infusion     No service coordination in this encounter.      Home Medical Care     No service coordination in this encounter.      Social Care     No service coordination in this encounter.                Demographic Summary     Row Name 07/11/18 1217       General Information    Admission Type inpatient    Arrived From home;emergency department    Required Notices Provided Important Message from Medicare    Referral Source admission list    Reason for Consult discharge planning    Preferred Language English       Contact Information    Permission Granted to Share Info With power of  for healthcare   ramón Beyer [phone]            Functional  Status     Row Name 07/11/18 1218       Functional Status    Usual Activity Tolerance poor    Current Activity Tolerance poor       Functional Status, IADL    Medications assistive person    Meal Preparation assistive person    Housekeeping assistive person    Laundry assistive person    Shopping completely dependent       Mental Status    General Appearance WDL WDL       Mental Status Summary    Recent Changes in Mental Status/Cognitive Functioning other (see comments)   per family lethargic falls asleep            Psychosocial    No documentation.           Abuse/Neglect    No documentation.           Legal    No documentation.           Substance Abuse    No documentation.           Patient Forms    No documentation.         Leslee Lpee RN

## 2018-07-11 NOTE — THERAPY EVALUATION
Acute Care - Occupational Therapy Initial Evaluation  [facility]     Patient Name: Lauren Monreal  : 1935  MRN: [id number]  Today's Date: 2018             Admit Date: 7/10/2018       ICD-10-CM ICD-9-CM   1. Fall, initial encounter W19.XXXA E888.9   2. Closed fracture of one rib of left side, initial encounter S22.32XA 807.01   3. Acute diverticulitis K57.92 562.11   4. Generalized weakness R53.1 780.79   5. Possible abscess L02.91 682.9     Patient Active Problem List   Diagnosis   • Pancolitis (CMS/HCC)   • COPD (chronic obstructive pulmonary disease) (CMS/HCC)   • Hypertension   • Sepsis (CMS/HCC)   • Acute bacterial colitis   • COPD (chronic obstructive pulmonary disease) (CMS/HCC)   • Hyperlipidemia   • Anxiety   • Acute on chronic respiratory failure with hypercapnia (CMS/HCC)   • Toxic encephalopathy   • Fall   • Diverticulitis of large intestine with abscess without bleeding   • Closed fracture of one rib of left side   • Tobacco abuse   • Narcolepsy   • Chronic respiratory failure with hypoxia (CMS/HCC)   • Memory loss   • Breast nodule     Past Medical History:   Diagnosis Date   • Anxiety    • Arthritis    • Cancer (CMS/HCC)     bladder.  sees dr smith   • COPD (chronic obstructive pulmonary disease) (CMS/HCC)    • Diverticulitis    • Hyperlipidemia    • Hypertension    • Osteoporosis      Past Surgical History:   Procedure Laterality Date   • APPENDECTOMY     • BLADDER SURGERY      dr smith.   for bladder cancer   • COLON SURGERY      dr huang.  diverticulosis.   • HEMORRHOIDECTOMY      dr huang   • HYSTERECTOMY     • TONSILLECTOMY            OT ASSESSMENT FLOWSHEET (last 72 hours)      Occupational Therapy Evaluation     Row Name 18 1131                   OT Evaluation Time/Intention    Subjective Information complains of;weakness  -VS        Document Type evaluation  -VS        Mode of Treatment occupational therapy  -VS        Patient Effort adequate  -VS         "Comment \"I can not see out of my (R) eye and my vision is limited in my (L)\" pt. stated   -VS           General Information    Patient Profile Reviewed? yes  -VS        General Observations of Patient PT. was supine in bed with her family at her bedside when the OT arrived   -VS        Prior Level of Function min assist:;ADL's  -VS        Pertinent History of Current Functional Problem We get Meals on Wheels so we do not have to cook   -VS        Existing Precautions/Restrictions fall  -VS        Limitations/Impairments visual  -VS        Barriers to Rehab medically complex;previous functional deficit;visual deficit  -VS           Cognitive Assessment/Intervention- PT/OT    Orientation Status (Cognition) oriented to;person;place  -VS        Follows Commands (Cognition) follows one step commands;increased processing time needed  -VS           Bed Mobility Assessment/Treatment    Bed Mobility Assessment/Treatment rolling left;rolling right;supine-sit;sit-supine  -VS        Rolling Left [place] (Bed Mobility) supervision;verbal cues  -VS        Rolling Right [place] (Bed Mobility) supervision;verbal cues  -VS        Supine-Sit [place] (Bed Mobility) verbal cues;contact guard  -VS        Sit-Supine [place] (Bed Mobility) verbal cues;contact guard  -VS           Transfer Assessment/Treatment    Transfer Assessment/Treatment sit-stand transfer;stand-sit transfer  -VS           Sit-Stand Transfer    Sit-Stand [place] (Transfers) verbal cues;contact guard  -VS           Stand-Sit Transfer    Stand-Sit [place] (Transfers) verbal cues;contact guard  -VS           General ROM    RT Upper Ext Comments  -VS        LT Upper Ext Comment  -VS           Right Upper Ext    Rt Upper Extremity Comments  AROM WFL  -VS           Left Upper Ext    Lt Upper Extremity Comments  AROM WFL  -VS           General Assessment (Manual Muscle Testing)    Comment, General Manual Muscle Testing (MMT) Assessment NA  -VS   "         Motor Assessment/Interventions    Additional Documentation Balance (Group);Balance Interventions (Group);Functional Endurance Training (Group);Neuromuscular Re-education (Group)  -VS           Balance    Balance static sitting balance;static standing balance  -VS           Static Sitting Balance    Level of [place] (Unsupported Sitting, Static Balance) supervision  -VS        Sitting Position (Unsupported Sitting, Static Balance) sitting on edge of bed  -VS        Time Able to Maintain Position (Supported Sitting, Static Balance) 3 to 4 minutes  -VS           Static Standing Balance    Level of [place] (Supported Standing, Static Balance) minimal assist, 75% patient effort  -VS        Time Able to Maintain Position (Supported Standing, Static Balance) 1 to 2 minutes  -VS           Functional Endurance Training    Comment, Functional Endurance fair -  -VS           Sensory Assessment/Intervention    Additional Documentation Vision Assessment/Intervention (Group)  -VS           Vision Assessment/Intervention    Impact of Vision Impairment on Function (Vision) per pt. 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 did nopt state a goal  -VS        Criteria for Skilled Therapeutic Interventions Met (OT Eval) treatment indicated  -VS        Rehab Potential (OT Eval) fair, will monitor progress closely  -VS        Therapy Frequency (OT Eval) 5 times/wk  -VS        Care Plan Review (OT) evaluation/treatment results reviewed;care plan/treatment goals reviewed  -VS           Planned OT  Interventions    Planned Therapy Interventions (OT Eval) activity tolerance training;BADL retraining;neuromuscular control/coordination retraining;transfer/mobility retraining  -VS           OT Goals    Transfer Goal Selection (OT) transfer, OT goal 1  -VS        Bathing Goal Selection (OT) bathing, OT goal 1  -VS        Balance Goal Selection (OT) balance, OT goal 1  -VS        Endurance Goal Selection (OT) endurance, OT goal 1  -VS        Additional Documentation Balance Goal Selection (OT) (Row);Endurance Goal Selection (OT) (Row)  -VS           Transfer Goal 1 (OT)    Activity/Assistive Device (Transfer Goal 1, OT) sit-to-stand/stand-to-sit;bed-to-chair/chair-to-bed  -VS        [place] Level/Cues Needed (Transfer Goal 1, OT) supervision required;verbal cues required  -VS        Time Frame (Transfer Goal 1, OT) short term goal (STG);1 week  -VS        Progress/Outcome (Transfer Goal 1, OT) continuing progress toward goal  -VS           Bathing Goal 1 (OT)    Activity/Assistive Device (Bathing Goal 1, OT) bathing skills, all  -VS        [place] Level/Cues Needed (Bathing Goal 1, OT) verbal cues required;minimum assist (75% or more patient effort)  -VS        Time Frame (Bathing Goal 1, OT) short term goal (STG);1 week  -VS        Progress/Outcomes (Bathing Goal 1, OT) continuing progress toward goal  -VS           Balance Goal 1 (OT)    Activity/Assistive Device (Balance Goal 1, OT) --   Pt. to staticly stand for 5 - 8 minutes to (A) with ADLs  -VS        [place] Level/Cues Needed (Balance Goal 1, OT) verbal cues required;minimum assist (75% or more patient effort)  -VS        Time Frame (Balance Goal 1, OT) short term goal (STG);1 week  -VS        Progress/Outcomes (Balance Goal 1, OT) continuing progress toward goal  -VS            Endurance Goal 1 (OT)    Activity Level (Endurance Goal 1, OT) to increase;endurance 2 fair +  -VS        Time Frame (Endurance Goal 1, OT) short term goal (STG);1 week   -VS        Progress/Outcome (Endurance Goal 1, OT) continuing progress toward goal  -VS          User Key  (r) = Recorded By, (t) = Taken By, (c) = Cosigned By    Initials Name Effective Dates    VS Jannie Wisdom OTR 06/08/18 -                  OT Recommendation and Plan  Planned Therapy Interventions (OT Eval): activity tolerance training, BADL retraining, neuromuscular control/coordination retraining, transfer/mobility retraining  Therapy Frequency (OT Eval): 5 times/wk  Outcome Summary: OT- PT. presents with decreased endurance, ADL sand transfers.  Pt. will benefit for skilled OT to increase all skills          Outcome Measures     Row Name 07/11/18 1500             How much help from another is currently needed...    Putting on and taking off regular lower body clothing? 2  -VS      Bathing (including washing, rinsing, and drying) 2  -VS      Toileting (which includes using toilet bed pan or urinal) 2  -VS      Putting on and taking off regular upper body clothing 2  -VS      Taking care of personal grooming (such as brushing teeth) 2  -VS      Eating meals 3  -VS      Score 13  -VS         Functional Assessment    Outcome Measure Options AM-PAC 6 Clicks Daily Activity (OT)  -VS        User Key  (r) = Recorded By, (t) = Taken By, (c) = Cosigned By    Initials Name Provider Type    VS CARRI Young Occupational Therapist          Time Calculation:   OT Start Time: 1112  OT Stop Time: 1131  OT Time Calculation (min): 19 min  Therapy Suggested Charges     Code   Minutes Charges    None           Therapy Charges for Today     Code Description Service Date Service Provider Modifiers Qty    [id number]  OT EVAL LOW COMPLEXITY 2 7/11/2018 CARRI Young GO 1    [id number]  OT NEUROMUSC RE EDUCATION EA 15 MIN 7/11/2018 CARRI Young GO 1               CARRI Young  7/11/2018

## 2018-07-11 NOTE — H&P
PCP: Héctor Abbott MD    Chief complaint   Chief Complaint   Patient presents with   • Weakness - Generalized     WITH DECREASED APPETITE FOR THE PAST 3 DAYS; PT REPORTS FALLING OUT OF A CHAIR 3 DAYS AGO       HPI  Patient is a 82 y.o. female presents with A history of diverticulitis and COPD who presents to the ER due to a fall and then left-sided rib pain, shortness of breath and abdominal pain.  Patient was Hospitalized in April of this year for COPD exacerbation and August 2016 for diverticulitis.  Patient was sitting at the kitchen table and fell asleep due to her narcolepsy about 4 days ago he fell hit her left side and has had an increasing oxygen need and using inhalers more since then.  Patient doesn't normally use traction during the day because she is afraid of falling because of all the cords.    Patient has been having abdominal pain for the last few days.  She's had decreased oral intake.  Hurts more over the left lower quadrant.  It's constant but the intensity can fluctuate.  She has had nausea but no vomiting.  Denies any fevers or chills.  An occasional loose stool but no blood.    PAST MEDICAL HISTORY  Past Medical History:   Diagnosis Date   • Anxiety    • Arthritis    • Cancer (CMS/HCC)     bladder.  sees dr smith   • COPD (chronic obstructive pulmonary disease) (CMS/HCC)    • Diverticulitis    • Hyperlipidemia    • Hypertension    • Osteoporosis        PAST SURGICAL HISTORY  Past Surgical History:   Procedure Laterality Date   • APPENDECTOMY     • BLADDER SURGERY      dr smith.   for bladder cancer   • COLON SURGERY      dr huang.  diverticulosis.   • HEMORRHOIDECTOMY      dr huang   • HYSTERECTOMY     • TONSILLECTOMY         FAMILY HISTORY  History reviewed. No pertinent family history.    SOCIAL HISTORY  Social History   Substance Use Topics   • Smoking status: Current Every Day Smoker     Packs/day: 1.00     Years: 61.00     Types: Cigarettes   • Smokeless tobacco: Not on file    • Alcohol use No       MEDICATIONS:  Prescriptions Prior to Admission   Medication Sig Dispense Refill Last Dose   • acetaminophen (TYLENOL) 650 MG 8 hr tablet Take 325 mg by mouth Every 6 (Six) Hours As Needed for Mild Pain .      • albuterol (PROAIR HFA) 108 (90 Base) MCG/ACT inhaler Inhale 1-2 puffs Every 4 (Four) Hours As Needed for Wheezing.   4/26/2018 at Unknown time   • albuterol (PROVENTIL) (2.5 MG/3ML) 0.083% nebulizer solution Take 2.5 mg by nebulization Every 4 (Four) Hours As Needed for Wheezing. 50 vial 0 7/9/2018 at Unknown time   • budesonide-formoterol (SYMBICORT) 160-4.5 MCG/ACT inhaler Inhale 1-2 puffs 2 (Two) Times a Day.   7/9/2018 at Unknown time   • cholecalciferol (VITAMIN D3) 1000 UNITS tablet Take 1,500 Units by mouth daily.      • docusate sodium (COLACE) 100 MG capsule Take 100 mg by mouth 2 (Two) Times a Day As Needed.   7/9/2018 at Unknown time   • escitalopram (LEXAPRO) 5 MG tablet Take 10 mg by mouth Daily.   7/9/2018 at Unknown time   • furosemide (LASIX) 40 MG tablet Take 40 mg by mouth Daily.   7/9/2018 at Unknown time   • lactobacillus acidophilus (RISAQUAD) capsule capsule Take 1 capsule by mouth daily. 30 capsule 0    • linaclotide (LINZESS) 290 MCG capsule capsule Take 72 mcg by mouth Daily.   7/9/2018 at Unknown time   • loperamide (IMODIUM) 2 MG capsule Take 2 mg by mouth 4 (Four) Times a Day As Needed for Diarrhea.      • loratadine-pseudoephedrine (CLARITIN-D 24 HOUR)  MG per 24 hr tablet Take 1 tablet by mouth Daily As Needed for Allergies.      • potassium chloride (MICRO-K) 10 MEQ CR capsule Take 10 mEq by mouth 2 (Two) Times a Day.   7/9/2018 at Unknown time   • simvastatin (ZOCOR) 10 MG tablet Take 10 mg by mouth every night.   7/9/2018 at Unknown time   • tiotropium (SPIRIVA) 18 MCG per inhalation capsule Place 1 capsule into inhaler and inhale Daily.   7/9/2018 at Unknown time   • predniSONE (DELTASONE) 10 MG tablet Take 4 tabs daily x 3 days, then take 3  "tabs daily x 3 days, then take 2 tabs daily x 3 days, then take 1 tab daily x 3 days 31 tablet 0        Allergies:  Adhesive tape and Latex    Review of Systems:  fatigue pleuritic pain, shortness of air  Negative for following (except as per HPI):  Constitution: chills, fevers,   Eyes: change of vision, loss of vision and discharge  ENT: ear drainage, ear ringing and facial trauma  Respiratory: cough,   Cardiovascular: chest pressure, pain, lower extremity edema, palpitations  Gastrointestinal: constipation, diarrhea, nausea, vomiting, pain    Integument: rash and wound  Hematologic / Lymphatic: excessive bleeding and easy bruising  Musculoskeletal: joint pain, joint stiffness, joint swelling and muscle pain  Neurological: headaches, numbness, seizures and tremors  Behavioral / Psych: anxiety, depression and hallucinations         Vital Signs  Temp:  [98.4 °F (36.9 °C)-98.9 °F (37.2 °C)] 98.4 °F (36.9 °C)  Heart Rate:  [53-92] 86  Resp:  [15-20] 20  BP: (102-139)/(56-74) 120/69  Flowsheet Rows      First Filed Value   Admission Height  152.4 cm (60\") Documented at 07/10/2018 1033   Admission Weight  44.9 kg (99 lb) Documented at 07/10/2018 1033           Physical Exam:  General Appearance:    Alert, cooperative, in no acute distress   Head:    Normocephalic, without obvious abnormality, atraumatic   Eyes:         conjunctivae and sclerae normal, no icterus, PERRLA, bruise above left eye   ENT:    Ears grossly intact, oral mucosa DRY,poor dentition   Neck:   No adenopathy, supple, trachea midline,    Back:     Normal to inspection, range of motion normal   Lungs:     Tachypnea, Fair air entry, few wheezes,respirations regular, even      Heart:    Regular rhythm and normal rate,  no murmur, normal S1 and S2,   Abdomen:     Normal bowel sounds, no masses,  soft Tender to palpation right lower quadrant and left lower quadrant, no rebound    Extremities:   Moves all extremities well, no cyanosis, no edema,           "   Pulses:   Pulses palpable and equal bilaterally   Skin:   No bleeding, rash,+ bruising right knee and left lateral eyebrow   Neurologic:    Psych:   Cranial nerves 2 - 12 grossly intact, sensation intact,     Moves all extremities well, equal bilateral strength 4/5    Alert and Oriented x 3, Normal Affect   LABS:  Admission on 07/10/2018   Component Date Value Ref Range Status   • Glucose 07/10/2018 87  65 - 99 mg/dL Final   • BUN 07/10/2018 15  8 - 23 mg/dL Final   • Creatinine 07/10/2018 0.76  0.57 - 1.00 mg/dL Final   • Sodium 07/10/2018 144  136 - 145 mmol/L Final   • Potassium 07/10/2018 4.2  3.5 - 5.2 mmol/L Final   • Chloride 07/10/2018 101  98 - 107 mmol/L Final   • CO2 07/10/2018 31.9* 22.0 - 29.0 mmol/L Final   • Calcium 07/10/2018 9.8  8.6 - 10.5 mg/dL Final   • Total Protein 07/10/2018 6.6  6.0 - 8.5 g/dL Final   • Albumin 07/10/2018 3.90  3.50 - 5.20 g/dL Final   • ALT (SGPT) 07/10/2018 8  1 - 33 U/L Final   • AST (SGOT) 07/10/2018 12  1 - 32 U/L Final    Specimen hemolyzed.  Results may be affected.   • Alkaline Phosphatase 07/10/2018 72  39 - 117 U/L Final   • Total Bilirubin 07/10/2018 0.6  0.1 - 1.2 mg/dL Final   • eGFR Non African Amer 07/10/2018 73  >60 mL/min/1.73 Final   • Globulin 07/10/2018 2.7  gm/dL Final   • A/G Ratio 07/10/2018 1.4  g/dL Final   • BUN/Creatinine Ratio 07/10/2018 19.7  7.0 - 25.0 Final   • Anion Gap 07/10/2018 11.1  mmol/L Final   • Color, UA 07/10/2018 Yellow  Yellow, Straw Final   • Appearance, UA 07/10/2018 Clear  Clear Final   • pH, UA 07/10/2018 6.5  5.0 - 8.0 Final   • Specific Gravity, UA 07/10/2018 1.018  1.005 - 1.030 Final   • Glucose, UA 07/10/2018 Negative  Negative Final   • Ketones, UA 07/10/2018 Negative  Negative Final   • Bilirubin, UA 07/10/2018 Negative  Negative Final   • Blood, UA 07/10/2018 Negative  Negative Final   • Protein, UA 07/10/2018 Trace* Negative Final   • Leuk Esterase, UA 07/10/2018 Small (1+)* Negative Final   • Nitrite, UA 07/10/2018  Negative  Negative Final   • Urobilinogen, UA 07/10/2018 1.0 E.U./dL  0.2 - 1.0 E.U./dL Final   • Lipase 07/10/2018 14  13 - 60 U/L Final   • Troponin T 07/10/2018 <0.010  0.000 - 0.030 ng/mL Final   • WBC 07/10/2018 8.11  4.50 - 10.70 10*3/mm3 Final   • RBC 07/10/2018 4.05  3.90 - 5.20 10*6/mm3 Final   • Hemoglobin 07/10/2018 12.7  11.9 - 15.5 g/dL Final   • Hematocrit 07/10/2018 40.1  35.6 - 45.5 % Final   • MCV 07/10/2018 99.0* 80.5 - 98.2 fL Final   • MCH 07/10/2018 31.4  26.9 - 32.0 pg Final   • MCHC 07/10/2018 31.7* 32.4 - 36.3 g/dL Final   • RDW 07/10/2018 12.5  11.7 - 13.0 % Final   • RDW-SD 07/10/2018 45.5  37.0 - 54.0 fl Final   • MPV 07/10/2018 10.6  6.0 - 12.0 fL Final   • Platelets 07/10/2018 196  140 - 500 10*3/mm3 Final   • Neutrophil % 07/10/2018 71.6  42.7 - 76.0 % Final   • Lymphocyte % 07/10/2018 14.7* 19.6 - 45.3 % Final   • Monocyte % 07/10/2018 11.3  5.0 - 12.0 % Final   • Eosinophil % 07/10/2018 2.3  0.3 - 6.2 % Final   • Basophil % 07/10/2018 0.1  0.0 - 1.5 % Final   • Immature Grans % 07/10/2018 0.0  0.0 - 0.5 % Final   • Neutrophils, Absolute 07/10/2018 5.80  1.90 - 8.10 10*3/mm3 Final   • Lymphocytes, Absolute 07/10/2018 1.19  0.90 - 4.80 10*3/mm3 Final   • Monocytes, Absolute 07/10/2018 0.92  0.20 - 1.20 10*3/mm3 Final   • Eosinophils, Absolute 07/10/2018 0.19  0.00 - 0.70 10*3/mm3 Final   • Basophils, Absolute 07/10/2018 0.01  0.00 - 0.20 10*3/mm3 Final   • Immature Grans, Absolute 07/10/2018 0.00  0.00 - 0.03 10*3/mm3 Final   • RBC, UA 07/10/2018 None Seen  None Seen, 0-2 /HPF Final   • WBC, UA 07/10/2018 3-5* None Seen, 0-2 /HPF Final   • Bacteria, UA 07/10/2018 1+* None Seen /HPF Final   • Squamous Epithelial Cells, UA 07/10/2018 0-2  None Seen, 0-2 /HPF Final   • Yeast, UA 07/10/2018 Small/1+ Budding Yeast  None Seen /HPF Final   • Hyaline Casts, UA 07/10/2018 None Seen  None Seen /LPF Final   • Methodology 07/10/2018 Manual Light Microscopy   Final   • Free T4 07/10/2018 0.90* 0.93  - 1.70 ng/dL Final   • TSH 07/10/2018 1.540  0.270 - 4.200 mIU/mL Final   • Magnesium 07/10/2018 2.1  1.6 - 2.4 mg/dL Final   • Site 07/10/2018 Arterial: right brachial   Final   • Juan José's Test 07/10/2018 N/A   Final   • pH, Arterial 07/10/2018 7.432  7.350 - 7.450 pH units Final   • pCO2, Arterial 07/10/2018 46.6* 35.0 - 45.0 mm Hg Final   • pO2, Arterial 07/10/2018 94.9  80.0 - 100.0 mm Hg Final   • HCO3, Arterial 07/10/2018 31.0* 22.0 - 28.0 mmol/L Final   • Base Excess, Arterial 07/10/2018 5.8* 0.0 - 2.0 mmol/L Final   • O2 Saturation Calculated 07/10/2018 97.5  92.0 - 99.0 % Final   • Barometric Pressure for Blood Gas 07/10/2018 752.0  mmHg Final   • Modality 07/10/2018 Cannula   Final   • Flow Rate 07/10/2018 2  lpm Final   • Rate 07/10/2018 18  Breaths/minute Final       DIAGNOSTICS:  Ct Head Without Contrast    Result Date: 7/10/2018   1. No acute abnormality is seen. 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  2. The remainder of the head CT is normal.  Radiation dose reduction techniques were utilized, including automated exposure control and exposure modulation based on body size.  This report was finalized on 7/10/2018 4:19 PM by Dr. Velasquez Cheng M.D.      Ct Chest/A/P Without Contrast    Result Date: 7/10/2018  Impression: 1.  Findings most suggestive of acute diverticulitis involving the ascending colon. A small rim-enhancing pericolonic fluid collection contiguous with the vaginal cuff is concerning for a small abscess. 2.  Mild thickening of the descending colon which appears similar to that seen on 8/24/2016. Findings concerning for mild coexisting colitis. 3.  1.6 cm well-circumscribed nodule within the right breast.  Correlation with mammography is recommended to exclude underlying neoplasm. 4.  Right nephrolithiasis. 5.  Severe emphysema.  This report was finalized on 7/10/2018 1:49 PM by Dr. Dipak Duong M.D.           Results Review:   I reviewed the patient's new clinical results.  Discussed with ER physician  I personally viewed and interpreted the patient's EKG/Telemetry data- sinus  Old records reviewed see above    ASSESSMENT AND PLAN    +Diverticulitis of large intestine with abscess without bleeding  -Since was a had surgery for diverticulitis years ago and had an exacerbation to years ago.  With there being a rim-enhancing pericolic fluid and possible abscess we will consult colorectal surgery  -IV levoquin, and iv flagyl  -IV fluids and holding Lasix    + COPD exacerbation (CMS/[facility])  --Treat COPD: iv steroids, scheduled Duonebs, Albuterol MN prn, Incentive Spirometry  -iv levo  -02 to keeps sat >88%    +Narcolepsy and then Fall  -Family make it sound like this is a repeated issue and she has bruising and fracture.  We'll get neurology to see as well as PT and OT     +Closed fracture of one rib of left side  -Likely causing some of her COPD issues.  We will encourage incentive spirometer and place Lidoderm patch as well as when necessary IV pain medication    + Hypertension   -Stable continue medical management      - Holding Lasix and potassium due to decreased oral intake due to abdominal pain     +Tobacco abuse= NicoDerm      +DVT proph: Lovenox 40  +Medical decision maker: grep     I discussed the patients findings and my recommendations with the patient and/or family.  Please reference all orders placed.    Ana He MD  07/10/18  8:12 PM

## 2018-07-12 PROBLEM — D72.829 LEUKOCYTOSIS: Status: ACTIVE | Noted: 2018-01-01

## 2018-07-12 PROBLEM — D72.819 LEUKOCYTOPENIA: Status: ACTIVE | Noted: 2018-01-01

## 2018-07-12 NOTE — PLAN OF CARE
Problem: Patient Care Overview  Goal: Plan of Care Review  Outcome: Ongoing (interventions implemented as appropriate)   07/12/18 1121   Coping/Psychosocial   Plan of Care Reviewed With patient   Plan of Care Review   Progress improving   OTHER   Outcome Summary Pt increasing with strength and bed mobility to get to EOB and amb with RWX

## 2018-07-12 NOTE — DISCHARGE PLACEMENT REQUEST
"Lauren Chavarria (82 y.o. Female)     Date of Birth Social Security Number Address Home Phone MRN    [id number]  [id number] [facility] [address] [phone] [id number]    [employer] Marital Status          [employer]        Admission Date Admission Type Admitting Provider Attending Provider Department, Room/Bed    7/10/18 Emergency Neri, MD Kristi Olvera, Ward Smith MD [address], 447/1    Discharge Date Discharge Disposition Discharge Destination                       Attending Provider:  Ward Berry MD    Allergies:  Adhesive Tape, Latex    Isolation:  None   Infection:  None   Code Status:  CPR    Ht:  152.4 cm (60\")   Wt:  48.4 kg (106 lb 9.6 oz)    Admission Cmt:  None   Principal Problem:  None                Active Insurance as of 7/10/2018     Primary Coverage     Payor Plan Insurance Group Employer/Plan Group    MEDICARE MEDICARE A & B      Payor Plan Address Payor Plan Phone Number Effective From Effective To    [address] [phone] 9/1/2001     [facility] [id number]       Subscriber Name Subscriber Birth Date Member ID       LAUREN CHAVARRIA 1935 [id number]           Secondary Coverage     Payor Plan Insurance Group Employer/Plan Group    [facility] SUPP [id number]     Payor Plan Address Payor Plan Phone Number Effective From Effective To    [address]  12/1/2016     [facility] [id number]       Subscriber Name Subscriber Birth Date Member ID       LAUREN CHAVARRIA 1935 [id number]                 Emergency Contacts      (Rel.) Home Phone Work Phone Mobile Phone    Jr ChavarriaEliseo (Spouse) [phone] -- [phone]    RahPepito (Son) [phone] -- [phone]               "

## 2018-07-12 NOTE — PROGRESS NOTES
Progress Note    Hd 3    S: positive flatus,  negative BM. C/o left rib pain.  Eating without nausea or abd pain    O:  Temp:  [97.9 °F (36.6 °C)-98.4 °F (36.9 °C)] 97.9 °F (36.6 °C)  Heart Rate:  [] 57  Resp:  [15-20] 15  BP: (115-126)/(58-66) 115/65      Intake/Output Summary (Last 24 hours) at 07/12/18 0824  Last data filed at 07/12/18 0515   Gross per 24 hour   Intake             1807 ml   Output              600 ml   Net             1207 ml       Abd:   soft, non distended      Results from last 7 days  Lab Units 07/12/18  0431   WBC 10*3/mm3 11.87*   HEMOGLOBIN g/dL 10.5*   HEMATOCRIT % 33.4*   PLATELETS 10*3/mm3 200       Results from last 7 days  Lab Units 07/12/18  0431   SODIUM mmol/L 145   POTASSIUM mmol/L 4.4   CHLORIDE mmol/L 111*   CO2 mmol/L 21.9*   BUN mg/dL 18   CREATININE mg/dL 0.67   GLUCOSE mg/dL 104*   CALCIUM mg/dL 8.3*         Results from last 7 days  Lab Units 07/10/18  1041   MAGNESIUM mg/dL 2.1         A/P: 82 y.o. female with left rib fracture.  Chronic constipation.   Ok to take home linzess. Add miralax.   Uncertain significance of wbc increase   Will follow

## 2018-07-12 NOTE — PLAN OF CARE
Problem: Fall Risk (Adult)  Goal: Identify Related Risk Factors and Signs and Symptoms  Outcome: Ongoing (interventions implemented as appropriate)    Goal: Absence of Fall  Outcome: Ongoing (interventions implemented as appropriate)      Problem: Patient Care Overview  Goal: Plan of Care Review  Outcome: Ongoing (interventions implemented as appropriate)   07/12/18 0438   Coping/Psychosocial   Plan of Care Reviewed With patient   Plan of Care Review   Progress improving   OTHER   Outcome Summary VSS; L rib pain managed w/ lidocaine patch; IV abx ongoing; no other acte changes; will continue to monitor.     Goal: Individualization and Mutuality  Outcome: Ongoing (interventions implemented as appropriate)    Goal: Discharge Needs Assessment  Outcome: Ongoing (interventions implemented as appropriate)    Goal: Interprofessional Rounds/Family Conf  Outcome: Ongoing (interventions implemented as appropriate)      Problem: Skin Injury Risk (Adult)  Goal: Identify Related Risk Factors and Signs and Symptoms  Outcome: Ongoing (interventions implemented as appropriate)    Goal: Skin Health and Integrity  Outcome: Ongoing (interventions implemented as appropriate)

## 2018-07-12 NOTE — PLAN OF CARE
Problem: Patient Care Overview  Goal: Plan of Care Review  Outcome: Ongoing (interventions implemented as appropriate)   07/12/18 1104 07/12/18 1105   Coping/Psychosocial   Plan of Care Reviewed With --  patient;family   Plan of Care Review   Progress --  improving   OTHER   Outcome Summary Pt participated in OT treatment- family present. Pt tolerated sitting @ EOB approx. 15 minutes during grooming and functional reaching tasks w/ SBA- pt demo lateral lean on bed as fatigue level increased- decreased activity tolerance noted.  --

## 2018-07-12 NOTE — THERAPY TREATMENT NOTE
Acute Care - Occupational Therapy Treatment Note  [facility]     Patient Name: Lauren Monreal  : 1935  MRN: [id number]  Today's Date: 2018  Onset of Illness/Injury or Date of Surgery: 07/10/18          Admit Date: 7/10/2018       ICD-10-CM ICD-9-CM   1. Fall, initial encounter W19.XXXA E888.9   2. Closed fracture of one rib of left side, initial encounter S22.32XA 807.01   3. Acute diverticulitis K57.92 562.11   4. Generalized weakness R53.1 780.79   5. Possible abscess L02.91 682.9     Patient Active Problem List   Diagnosis   • Pancolitis (CMS/HCC)   • COPD (chronic obstructive pulmonary disease) (CMS/HCC)   • Hypertension   • Sepsis (CMS/HCC)   • Acute bacterial colitis   • COPD (chronic obstructive pulmonary disease) (CMS/HCC)   • Hyperlipidemia   • Anxiety   • Acute on chronic respiratory failure with hypercapnia (CMS/HCC)   • Toxic encephalopathy   • Fall   • Diverticulitis of large intestine with abscess without bleeding   • Closed fracture of one rib of left side   • Tobacco abuse   • Narcolepsy   • Chronic respiratory failure with hypoxia (CMS/HCC)   • Memory loss   • Breast nodule     Past Medical History:   Diagnosis Date   • Anxiety    • Arthritis    • Cancer (CMS/HCC)     bladder.  sees dr smith   • COPD (chronic obstructive pulmonary disease) (CMS/HCC)    • Diverticulitis    • Hyperlipidemia    • Hypertension    • Osteoporosis      Past Surgical History:   Procedure Laterality Date   • APPENDECTOMY     • BLADDER SURGERY      dr smith.   for bladder cancer   • COLON SURGERY      dr huang.  diverticulosis.   • HEMORRHOIDECTOMY      dr huang   • HYSTERECTOMY     • TONSILLECTOMY         Therapy Treatment          Rehabilitation Treatment Summary     Row Name 18 0847             Treatment Time/Intention    Discipline occupational therapist  -RP      Document Type therapy note (daily note)  -RP      Subjective Information no complaints  -RP      Mode of Treatment  occupational therapy  -RP      Patient/Family Observations Pt semi-supine in bed in no acute distress; pt family present  -RP      Care Plan Review evaluation/treatment results reviewed;care plan/treatment goals reviewed;patient/other agree to care plan  -RP      Care Plan Review, Other Participant(s) family  -RP      Patient Effort good  -RP      Existing Precautions/Restrictions fall;oxygen therapy device and L/min  -RP      Recorded by [RP] Luann Wynn, OT 07/12/18 1103      Row Name 07/12/18 0847             Vital Signs    Pre SpO2 (%) 94  -RP      O2 Delivery Pre Treatment supplemental O2  -RP      Intra SpO2 (%) 88  -RP      O2 Delivery Intra Treatment supplemental O2  -RP      Post SpO2 (%) 92  -RP      O2 Delivery Post Treatment supplemental O2  -RP      Pre Patient Position Supine  -RP      Intra Patient Position Sitting  -RP      Post Patient Position Supine  -RP      Recorded by [RP] Luann Wynn OT 07/12/18 1107      Row Name 07/12/18 0847             Cognitive Assessment/Intervention- PT/OT    Orientation Status (Cognition) oriented to;person;place  -RP      Follows Commands (Cognition) follows one step commands;over 90% accuracy;increased processing time needed  -RP      Personal Safety Interventions fall prevention program maintained;nonskid shoes/slippers when out of bed  -RP      Recorded by [RP] Luann Wynn OT 07/12/18 1103      Row Name 07/12/18 0847             Bed Mobility Assessment/Treatment    Bed Mobility Assessment/Treatment supine-sit;sit-supine  -RP      Supine-Sit [place] (Bed Mobility) supervision;verbal cues  -RP      Sit-Supine [place] (Bed Mobility) supervision;verbal cues  -RP      Assistive Device (Bed Mobility) bed rails;head of bed elevated  -RP      Recorded by [RP] Luann Wynn OT 07/12/18 1103      Row Name 07/12/18 0847             ADL Assessment/Intervention    BADL Assessment/Intervention grooming  -RP      Recorded by [RP] Luann Wynn OT 07/12/18  1103      Row Name 07/12/18 0847             Grooming Assessment/Training    [place] Level (Grooming) oral care regimen;wash face, hands  -RP      Grooming Position edge of bed sitting  -RP      Recorded by [RP] Luann Wynn, OT 07/12/18 1103      Row Name 07/12/18 0847             Balance    Balance static sitting balance  -RP      Recorded by [RP] Luann Wynn, OT 07/12/18 1103      Row Name 07/12/18 0847             Static Sitting Balance    Level of [place] (Unsupported Sitting, Static Balance) standby assist  -RP      Sitting Position (Unsupported Sitting, Static Balance) sitting on edge of bed  -RP      Time Able to Maintain Position (Unsupported Sitting, Static Balance) more than 5 minutes  -RP      Comment (Unsupported Sitting, Static Balance) Pt sat @ EOB approx. 15 min during grooming and functional reaching tasks- pt leaned on bed occasionally d/t fatigue  -RP      Recorded by [RP] Luann Wynn, OT 07/12/18 1103      Row Name 07/12/18 0847             Positioning and Restraints    Pre-Treatment Position in bed  -RP      Post Treatment Position bed  -RP      In Bed fowlers;call light within reach;encouraged to call for assist;with family/caregiver  -RP      Recorded by [RP] Luann Wynn, OT 07/12/18 1103      Row Name 07/12/18 0847             Pain Assessment    Additional Documentation Pain Scale: Word Pre/Post-Treatment (Group)  -RP      Recorded by [RP] Luann Wynn, OT 07/12/18 1103      Row Name 07/12/18 0847             Pain Scale: Word Pre/Post-Treatment    Pain: Word Scale, Pretreatment 2 - mild pain  -RP      Pain: Word Scale, Post-Treatment 2 - mild pain  -RP      Recorded by [RP] Luann Wynn, OT 07/12/18 1103      Row Name 07/12/18 0847             Coping    Observed Emotional State accepting;calm;cooperative  -RP      Verbalized Emotional State acceptance  -RP      Recorded by [RP] Luann Wynn, OT 07/12/18 1103      Row Name 07/12/18 0847             Plan of Care  Review    Plan of Care Reviewed With patient;family  -RP      Recorded by [RP] Luann Wynn OT 07/12/18 1103      Row Name 07/12/18 0847             Outcome Summary/Treatment Plan (OT)    Anticipated Discharge Disposition (OT) skilled nursing facility;home with assist  -RP      Recorded by [RP] Luann Wynn OT 07/12/18 1103        User Key  (r) = Recorded By, (t) = Taken By, (c) = Cosigned By    Initials Name Effective Dates Discipline    RP Luann Wynn OT 05/03/18 -  OT               Occupational Therapy Education     Title: PT OT SLP Therapies (Active)     Topic: Occupational Therapy (Done)     Point: ADL training (Done)     Description: Instruct learner(s) on proper safety adaptation and remediation techniques during self care or transfers.   Instruct in proper use of assistive devices.   Learning Progress Summary     Learner Status Readiness Method Response Comment Documented by    Patient Done Dieter DE LA CRUZ OT educ on OT role in therapeutic process and benefits of therapy RP 07/12/18 1103    Family Done Dieter E JONO OT educ on OT role in therapeutic process and benefits of therapy RP 07/12/18 1103          Point: Home exercise program (Done)     Description: Instruct learner(s) on appropriate technique for monitoring, assisting and/or progressing therapeutic exercises/activities.   Learning Progress Summary     Learner Status Readiness Method Response Comment Documented by    Patient Done Dieter PETERSON VU OT educ on OT role in therapeutic process and benefits of therapy RP 07/12/18 1103    Family Done Dieter DE LA CRUZ OT educ on OT role in therapeutic process and benefits of therapy RP 07/12/18 1103                      User Key     Initials Effective Dates Name Provider Type Discipline    RP 05/03/18 -  Luann Wynn OT Occupational Therapist OT                OT Recommendation and Plan  Outcome Summary/Treatment Plan (OT)  Anticipated Discharge Disposition (OT): skilled nursing facility, home with assist  Plan of  Care Review  Plan of Care Reviewed With: patient, family  Plan of Care Reviewed With: patient, family  Outcome Summary: Pt participated in OT treatment- family present. Pt tolerated sitting @  EOB approx. 15 minutes during grooming and functional reaching tasks w/ SBA- pt leaned on bed as fatigue level increased- decreased activity tolerance noted.         Outcome Measures     Row Name 07/12/18 1100 07/11/18 1602 07/11/18 1500       How much help from another person do you currently need...    Turning from your back to your side while in flat bed without using bedrails?  -- 4  -CH  --    Moving from lying on back to sitting on the side of a flat bed without bedrails?  -- 4  -CH  --    Moving to and from a bed to a chair (including a wheelchair)?  -- 3  -CH  --    Standing up from a chair using your arms (e.g., wheelchair, bedside chair)?  -- 3  -CH  --    Climbing 3-5 steps with a railing?  -- 1  -CH  --    To walk in hospital room?  -- 3  -CH  --    AM-PAC 6 Clicks Score  -- 18  -CH  --       How much help from another is currently needed...    Putting on and taking off regular lower body clothing? 2  -RP  -- 2  -VS    Bathing (including washing, rinsing, and drying) 2  -RP  -- 2  -VS    Toileting (which includes using toilet bed pan or urinal) 2  -RP  -- 2  -VS    Putting on and taking off regular upper body clothing 3  -RP  -- 2  -VS    Taking care of personal grooming (such as brushing teeth) 3  -RP  -- 2  -VS    Eating meals 3  -RP  -- 3  -VS    Score 15  -RP  -- 13  -VS       Functional Assessment    Outcome Measure Options AM-PAC 6 Clicks Daily Activity (OT)  -RP AM-PAC 6 Clicks Basic Mobility (PT)  - AM-PAC 6 Clicks Daily Activity (OT)  -VS      User Key  (r) = Recorded By, (t) = Taken By, (c) = Cosigned By    Initials Name Provider Type    VS Jannie Wisdom OTR Occupational Therapist    IVÁN Kiser, PT Physical Therapist    JUAREZ Wynn, OT Occupational Therapist           Time  Calculation:         Time Calculation- OT     Row Name 07/12/18 1107             Time Calculation- OT    OT Start Time 0847  -RP      OT Stop Time 0913  -RP      OT Time Calculation (min) 26 min  -RP      Total Timed Code Minutes- OT 26 minute(s)  -RP        User Key  (r) = Recorded By, (t) = Taken By, (c) = Cosigned By    Initials Name Provider Type    RP Luann Wynn OT Occupational Therapist           Therapy Suggested Charges     Code   Minutes Charges    None           Therapy Charges for Today     Code Description Service Date Service Provider Modifiers Qty    46765463437 HC OT SELF CARE/MGMT/TRAIN EA 15 MIN 7/12/2018 Luann Wynn, OT GO 1    35202233723 HC OT THERAPEUTIC ACT EA 15 MIN 7/12/2018 Luann Wynn OT GO 1               Luann Wynn OT  7/12/2018

## 2018-07-12 NOTE — THERAPY TREATMENT NOTE
Acute Care - Physical Therapy Treatment Note  [facility]     Patient Name: Lauren Monreal  : 1935  MRN: [id number]  Today's Date: 2018  Onset of Illness/Injury or Date of Surgery: 07/10/18          Admit Date: 7/10/2018    Visit Dx:    ICD-10-CM ICD-9-CM   1. Fall, initial encounter W19.XXXA E888.9   2. Closed fracture of one rib of left side, initial encounter S22.32XA 807.01   3. Acute diverticulitis K57.92 562.11   4. Generalized weakness R53.1 780.79   5. Possible abscess L02.91 682.9     Patient Active Problem List   Diagnosis   • Pancolitis (CMS/HCC)   • COPD (chronic obstructive pulmonary disease) (CMS/HCC)   • Hypertension   • Sepsis (CMS/HCC)   • Acute bacterial colitis   • COPD (chronic obstructive pulmonary disease) (CMS/HCC)   • Hyperlipidemia   • Anxiety   • Acute on chronic respiratory failure with hypercapnia (CMS/HCC)   • Toxic encephalopathy   • Fall   • Diverticulitis of large intestine with abscess without bleeding   • Closed fracture of one rib of left side   • Tobacco abuse   • Narcolepsy   • Chronic respiratory failure with hypoxia (CMS/HCC)   • Memory loss   • Breast nodule       Therapy Treatment          Rehabilitation Treatment Summary     Row Name 18 1105 18 0847          Treatment Time/Intention    Discipline physical therapy assistant  -CW occupational therapist  -RP     Document Type therapy note (daily note)  -CW therapy note (daily note)  -RP     Subjective Information no complaints  -CW no complaints  -RP     Mode of Treatment physical therapy  -CW occupational therapy  -RP     Patient/Family Observations  -- Pt semi-supine in bed in no acute distress; pt family present  -RP     Care Plan Review  -- evaluation/treatment results reviewed;care plan/treatment goals reviewed;patient/other agree to care plan  -RP     Care Plan Review, Other Participant(s)  -- family  -RP     Therapy Frequency (PT Clinical Impression) daily  -CW  --     Patient Effort good   -CW good  -RP     Existing Precautions/Restrictions fall;oxygen therapy device and L/min  -CW fall;oxygen therapy device and L/min  -RP     Recorded by [CW] Frederic Carlson, PTA 07/12/18 1121 [RP] Luann Wynn OT 07/12/18 1103     Row Name 07/12/18 1105 07/12/18 0847          Vital Signs    Pre SpO2 (%)  -- 94  -RP     O2 Delivery Pre Treatment supplemental O2  -CW supplemental O2  -RP     Intra SpO2 (%)  -- 88  -RP     O2 Delivery Intra Treatment supplemental O2  -CW supplemental O2  -RP     Post SpO2 (%)  -- 92  -RP     O2 Delivery Post Treatment supplemental O2  -CW supplemental O2  -RP     Pre Patient Position  -- Supine  -RP     Intra Patient Position  -- Sitting  -RP     Post Patient Position  -- Supine  -RP     Recorded by [CW] Frederic Carlson, PTA 07/12/18 1121 [RP] Luann Wynn OT 07/12/18 1107     Row Name 07/12/18 1105 07/12/18 0847          Cognitive Assessment/Intervention- PT/OT    Orientation Status (Cognition) oriented to;person;place  -CW oriented to;person;place  -RP     Follows Commands (Cognition) follows one step commands;over 90% accuracy;increased processing time needed  -CW follows one step commands;over 90% accuracy;increased processing time needed  -RP     Cognitive Function (Cognitive) WFL  -CW  --     Personal Safety Interventions fall prevention program maintained;gait belt;nonskid shoes/slippers when out of bed;muscle strengthening facilitated  -CW fall prevention program maintained;nonskid shoes/slippers when out of bed  -RP     Recorded by [CW] Frederic Carlson, PTA 07/12/18 1121 [RP] Luann Wynn, OT 07/12/18 1103     Row Name 07/12/18 1105 07/12/18 0847          Bed Mobility Assessment/Treatment    Bed Mobility Assessment/Treatment supine-sit;sit-supine  -CW supine-sit;sit-supine  -RP     Supine-Sit [place] (Bed Mobility) supervision;verbal cues  -CW supervision;verbal cues  -RP     Sit-Supine [place] (Bed Mobility) supervision;verbal cues  -CW  supervision;verbal cues  -RP     Assistive Device (Bed Mobility) bed rails  -CW bed rails;head of bed elevated  -RP     Recorded by [CW] Frederic Carlson, PTA 07/12/18 1121 [RP] Luann Wynn, OT 07/12/18 1103     Row Name 07/12/18 1105             Transfer Assessment/Treatment    Transfer Assessment/Treatment sit-stand transfer;stand-sit transfer  -CW      Recorded by [CW] Frederic Carlson, PTA 07/12/18 1121      Row Name 07/12/18 1105             Sit-Stand Transfer    Sit-Stand [place] (Transfers) contact guard  -CW      Assistive Device (Sit-Stand Transfers) walker, front-wheeled  -CW      Recorded by [CW] Frederic Carlson, PTA 07/12/18 1121      Row Name 07/12/18 1105             Stand-Sit Transfer    Stand-Sit [place] (Transfers) contact guard  -CW      Assistive Device (Stand-Sit Transfers) walker, front-wheeled  -CW      Recorded by [CW] Frederic Carlson, PTA 07/12/18 1121      Row Name 07/12/18 1105             Gait/Stairs Assessment/Training    [place] Level (Gait) contact guard  -CW      Assistive Device (Gait) walker, front-wheeled  -CW      Distance in Feet (Gait) 80  -CW      Pattern (Gait) step-through  -CW      Deviations/Abnormal Patterns (Gait) yoli decreased;stride length decreased  -CW      Bilateral Gait Deviations forward flexed posture  -CW      Recorded by [CW] Frederic Carlson, PTA 07/12/18 1121      Row Name 07/12/18 0847             ADL Assessment/Intervention    BADL Assessment/Intervention grooming  -RP      Recorded by [RP] Luann Wynn, OT 07/12/18 1103      Row Name 07/12/18 0847             Grooming Assessment/Training    [place] Level (Grooming) oral care regimen;wash face, hands  -RP      Grooming Position edge of bed sitting  -RP      Recorded by [RP] Luann Wynn, OT 07/12/18 1103      Row Name 07/12/18 0847             Balance    Balance static sitting balance  -RP      Recorded by [RP] Luann Wynn, OT 07/12/18 1103      Row Name 07/12/18  0847             Static Sitting Balance    Level of [place] (Unsupported Sitting, Static Balance) standby assist  -RP      Sitting Position (Unsupported Sitting, Static Balance) sitting on edge of bed  -RP      Time Able to Maintain Position (Unsupported Sitting, Static Balance) more than 5 minutes  -RP      Comment (Unsupported Sitting, Static Balance) Pt sat @ EOB approx. 15 min during grooming and functional reaching tasks- pt leaned on bed occasionally d/t fatigue  -RP      Recorded by [RP] Luann Wynn, OT 07/12/18 1103      Row Name 07/12/18 1105 07/12/18 0847          Positioning and Restraints    Pre-Treatment Position in bed  -CW in bed  -RP     Post Treatment Position bed  -CW bed  -RP     In Bed notified nsg;supine;call light within reach;encouraged to call for assist;exit alarm on;with family/caregiver  -CW fowlers;call light within reach;encouraged to call for assist;with family/caregiver  -RP     Recorded by [CW] Frederic Carlson, PTA 07/12/18 1121 [RP] Luann Wynn, OT 07/12/18 1103     Row Name 07/12/18 1105 07/12/18 0847          Pain Assessment    Additional Documentation Pain Scale: Numbers Pre/Post-Treatment (Group)  -CW Pain Scale: Word Pre/Post-Treatment (Group)  -RP     Recorded by [CW] Frederic Carlson, PTA 07/12/18 1121 [RP] Luann Wynn, OT 07/12/18 1103     Row Name 07/12/18 1105             Pain Scale: Numbers Pre/Post-Treatment    Pain Scale: Numbers, Post-Treatment 4/10  -CW      Pain Location - Side Left  -CW      Pain Location flank  -CW      Pain Intervention(s) Repositioned;Ambulation/increased activity  -CW      Recorded by [CW] Frederic Carlson, PTA 07/12/18 1121      Row Name 07/12/18 0847             Pain Scale: Word Pre/Post-Treatment    Pain: Word Scale, Pretreatment 2 - mild pain  -RP      Pain: Word Scale, Post-Treatment 2 - mild pain  -RP      Recorded by [RP] Luann Wynn, OT 07/12/18 1103      Row Name 07/12/18 0847             Coping    Observed  Emotional State accepting;calm;cooperative  -RP      Verbalized Emotional State acceptance  -RP      Recorded by [RP] Luann Wynn, OT 07/12/18 1103      Row Name 07/12/18 0847             Plan of Care Review    Plan of Care Reviewed With patient;family  -RP      Recorded by [RP] Luann Wynn, OT 07/12/18 1103      Row Name 07/12/18 0847             Outcome Summary/Treatment Plan (OT)    Anticipated Discharge Disposition (OT) skilled nursing facility;home with assist  -RP      Recorded by [RP] Luann Wynn, OT 07/12/18 1103      Row Name 07/12/18 1105             Outcome Summary/Treatment Plan (PT)    Anticipated Discharge Disposition (PT) home with home health;skilled nursing facility  -CW      Recorded by [CW] Frederic Carlson, PTA 07/12/18 1121        User Key  (r) = Recorded By, (t) = Taken By, (c) = Cosigned By    Initials Name Effective Dates Discipline    CW Frederic Carlson, PTA 03/07/18 -  PT    RP Luann Wynn, OT 05/03/18 -  OT                     Physical Therapy Education     Title: PT OT SLP Therapies (Done)     Topic: Physical Therapy (Done)     Point: Mobility training (Done)    Learning Progress Summary     Learner Status Readiness Method Response Comment Documented by    Patient Done Acceptance RAMBO PETERSON DU CW 07/12/18 1121     Done Acceptance ETBD VU,NR   07/11/18 1601          Point: Home exercise program (Done)    Learning Progress Summary     Learner Status Readiness Method Response Comment Documented by    Patient Done Acceptance RAMBO PETERSON DU CW 07/12/18 1121          Point: Body mechanics (Done)    Learning Progress Summary     Learner Status Readiness Method Response Comment Documented by    Patient Done Acceptance ETB ELIER DE LA CRUZ 07/12/18 1121     Done Acceptance E,TB,D VU,NR   07/11/18 1601          Point: Precautions (Done)    Learning Progress Summary     Learner Status Readiness Method Response Comment Documented by    Patient Done Acceptance RAMBO PETERSON DU CW 07/12/18 1121      Done Acceptance E,TB,D VU,NR   07/11/18 1601                      User Key     Initials Effective Dates Name Provider Type Discipline     04/03/18 -  Soha Kiser, PT Physical Therapist PT     03/07/18 -  Frederic Carlson PTA Physical Therapy Assistant PT                    PT Recommendation and Plan  Anticipated Discharge Disposition (PT): home with home health, skilled nursing facility  Therapy Frequency (PT Clinical Impression): daily  Outcome Summary/Treatment Plan (PT)  Anticipated Discharge Disposition (PT): home with home health, skilled nursing facility  Plan of Care Reviewed With: patient  Progress: improving  Outcome Summary: Pt increasing with strength and bed mobility to get to EOB and amb with RWX          Outcome Measures     Row Name 07/12/18 1122 07/12/18 1100 07/11/18 1602       How much help from another person do you currently need...    Turning from your back to your side while in flat bed without using bedrails? 4  -CW  -- 4  -CH    Moving from lying on back to sitting on the side of a flat bed without bedrails? 4  -CW  -- 4  -CH    Moving to and from a bed to a chair (including a wheelchair)? 3  -CW  -- 3  -CH    Standing up from a chair using your arms (e.g., wheelchair, bedside chair)? 3  -CW  -- 3  -CH    Climbing 3-5 steps with a railing? 1  -CW  -- 1  -CH    To walk in hospital room? 3  -CW  -- 3  -CH    AM-PAC 6 Clicks Score 18  -CW  -- 18  -CH       How much help from another is currently needed...    Putting on and taking off regular lower body clothing?  -- 2  -RP  --    Bathing (including washing, rinsing, and drying)  -- 2  -RP  --    Toileting (which includes using toilet bed pan or urinal)  -- 2  -RP  --    Putting on and taking off regular upper body clothing  -- 3  -RP  --    Taking care of personal grooming (such as brushing teeth)  -- 3  -RP  --    Eating meals  -- 3  -RP  --    Score  -- 15  -RP  --       Functional Assessment    Outcome Measure Options  AM-PAC 6 Clicks Basic Mobility (PT)  -CW AM-PAC 6 Clicks Daily Activity (OT)  -RP AM-PAC 6 Clicks Basic Mobility (PT)  -    Row Name 07/11/18 1500             How much help from another is currently needed...    Putting on and taking off regular lower body clothing? 2  -VS      Bathing (including washing, rinsing, and drying) 2  -VS      Toileting (which includes using toilet bed pan or urinal) 2  -VS      Putting on and taking off regular upper body clothing 2  -VS      Taking care of personal grooming (such as brushing teeth) 2  -VS      Eating meals 3  -VS      Score 13  -VS         Functional Assessment    Outcome Measure Options AM-PAC 6 Clicks Daily Activity (OT)  -VS        User Key  (r) = Recorded By, (t) = Taken By, (c) = Cosigned By    Initials Name Provider Type    VS Jannie Wisdom OTR Occupational Therapist    IVÁN Kiser, PT Physical Therapist    CW Frederic Carlson PTA Physical Therapy Assistant    RP Luann Wynn, OT Occupational Therapist           Time Calculation:         PT Charges     Row Name 07/12/18 1122             Time Calculation    Start Time 1105  -CW      Stop Time 1122  -CW      Time Calculation (min) 17 min  -CW      PT Received On 07/12/18  -CW      PT - Next Appointment 07/13/18  -CW        User Key  (r) = Recorded By, (t) = Taken By, (c) = Cosigned By    Initials Name Provider Type    CW Frederic Carlson PTA Physical Therapy Assistant        Therapy Suggested Charges     Code   Minutes Charges    None           Therapy Charges for Today     Code Description Service Date Service Provider Modifiers Qty    41006221055 HC PT THER PROC EA 15 MIN 7/12/2018 Frederic Carlson PTA GP 1    41586267734 HC PT THER SUPP EA 15 MIN 7/12/2018 Frederic Carlson PTA GP 1          PT G-Codes  Outcome Measure Options: AM-PAC 6 Clicks Basic Mobility (PT)    Frederic Carlson PTA  7/12/2018

## 2018-07-12 NOTE — PROGRESS NOTES
Continued Stay Note  [facility]     Patient Name: Lauren Monreal  MRN: [id number]  Today's Date: 7/12/2018    Admit Date: 7/10/2018          Discharge Plan     Row Name 07/12/18 0924       Plan    Plan sub acute rehab    Patient/Family in Agreement with Plan yes   spoke at bedside with spouse, neice and pt all in agreement. Son not here and when requested if CCP should contact spouse said no    Plan Comments Met at bedside with pt and family to discuss rehab choices. [facility] would [facility]. Long discussion regarding long term plans. Spouse and neice interested in PC, pt does not feel they can afford. All with numerous questions. Discussed using nurse liason for [facility] Maria Victoria Ely. They would like CCP to call so that they can begin planning for longterm care. When asked if CCP should contact son who is POA spouse said no he would speak with him but that Maria Victoria could call him to arrange a meeting so that they were all involved. Referral called to all. CCP will follow....[facility]              Discharge Codes    No documentation.       Expected Discharge Date and Time     Expected Discharge Date Expected Discharge Time    Jul 13, 2018             Leslee Lepe RN

## 2018-07-12 NOTE — PROGRESS NOTES
Name: Lauren Morneal ADMIT: 7/10/2018   : 1935  PCP: Héctor Abbott MD    MRN: [id number] LOS: 2 days   AGE/SEX: 82 y.o. female  ROOM: [facility]   Subjective   Wants real food  abd pain since she was 13  Intermittent diarrhea is chronic  Only has left side rib pain, denies any change in her chronic abdominal pain  Falls frequently and chronic issue    Objective   Vital Signs  Temp:  [97.9 °F (36.6 °C)-98.4 °F (36.9 °C)] 97.9 °F (36.6 °C)  Heart Rate:  [] 86  Resp:  [15-20] 15  BP: (115-126)/(58-66) 115/65  SpO2:  [91 %-96 %] 92 %  on  Flow (L/min):  [1-2] 1.5;   Device (Oxygen Therapy): nasal cannula  Body mass index is 20.82 kg/m².    Physical Exam   Constitutional: No distress.   Elderly and frail appearing     HENT:   Head: Normocephalic and atraumatic.   Neck: No JVD present.   Cardiovascular: Normal rate and regular rhythm.  Exam reveals no friction rub.    No murmur heard.  Pulmonary/Chest: Effort normal and breath sounds normal. No stridor. No respiratory distress. She has no wheezes.   Mildly prolonged expiration     Abdominal: Soft. Bowel sounds are normal. She exhibits no distension. There is no tenderness. There is no guarding.   Genitourinary:   Genitourinary Comments: Small palpable right breast nodule about 11 o'clock   Musculoskeletal: She exhibits no edema or tenderness.   Neurological: She is alert.   Skin: She is not diaphoretic. No erythema. No pallor.   Psychiatric: She has a normal mood and affect. Her behavior is normal.   Vitals reviewed.      Results Review:       I reviewed the patient's new clinical results.    Results from last 7 days  Lab Units 18  0431 18  0303 07/10/18  1041   WBC 10*3/mm3 11.87* 5.18 8.11   HEMOGLOBIN g/dL 10.5* 11.0* 12.7   PLATELETS 10*3/mm3 200 191 196       Results from last 7 days  Lab Units 18  0431 18  0303 07/10/18  1041   SODIUM mmol/L 145 142 144   POTASSIUM mmol/L 4.4 4.0 4.2   CHLORIDE mmol/L 111* 104 101   CO2 mmol/L  21.9* 25.8 31.9*   BUN mg/dL 18 21 15   CREATININE mg/dL 0.67 0.74 0.76   GLUCOSE mg/dL 104* 166* 87   Estimated Creatinine Clearance: 42.1 mL/min (by C-G formula based on SCr of 0.67 mg/dL).    Results from last 7 days  Lab Units 07/12/18  0431 07/11/18  0303 07/10/18  1041   CALCIUM mg/dL 8.3* 8.4* 9.8   ALBUMIN g/dL  --   --  3.90   MAGNESIUM mg/dL  --   --  2.1         budesonide-formoterol 2 puff Inhalation BID - RT   cholecalciferol 1,500 Units Oral Daily   enoxaparin 40 mg Subcutaneous Q24H   escitalopram 10 mg Oral Daily   guaiFENesin 1,200 mg Oral Q12H   ipratropium-albuterol 3 mL Nebulization Q4H - RT   lactobacillus acidophilus 1 capsule Oral Daily   lidocaine 1 patch Transdermal Q24H   linaclotide 290 mcg Oral Daily   nicotine 1 patch Transdermal Q24H   polyethylene glycol 17 g Oral Daily       sodium chloride 0.9 % with KCl 20 mEq 75 mL/hr Last Rate: 75 mL/hr (07/11/18 0935)   Diet Regular; GI Soft/[place]      Assessment/Plan      Active Hospital Problems    Diagnosis Date Noted   • Chronic respiratory failure with hypoxia (CMS/[facility]) [J96.11] 07/11/2018   • Memory loss [R41.3] 07/11/2018   • Breast nodule [N63.0] 07/11/2018   • Fall [W19.XXXA] 07/10/2018   • Diverticulitis of large intestine with abscess without bleeding [K57.20] 07/10/2018   • Closed fracture of one rib of left side [S22.32XA] 07/10/2018   • Tobacco abuse [Z72.0] 07/10/2018   • Narcolepsy [G47.419] 07/10/2018   • COPD (chronic obstructive pulmonary disease) (CMS/HCC) [J44.9] 04/26/2018   • Hypertension [I10] 08/25/2016      Resolved Hospital Problems    Diagnosis Date Noted Date Resolved   No resolved problems to display.       Diverticulitis of large intestine with abscess without bleeding  -Consulted colorectal surgery given rim-enhancing pericolic fluid and possible abscess and not likely to have diverticulitis  -leukocytosis today related to 3 doses of solumedrol 80mg   -D/C IV levoquin, and iv flagyl  -IV fluids and holding Lasix,  decrease IV fluid rate  -Pt states she doesn't want any surgery  -advance diet     COPD with chronic respiratory failure with hypoxia (2L)  -Doesn't appear to be in exacerbation  -Stopped steroids  -continue  - scheduled Duonebs, Albuterol  prn, Incentive Spirometry  -iv levo  -02 to keeps sat >88%     Narcolepsy and falls  -Family make it sound like this is a repeated issue and she has bruising and fracture.    -appreciate neurology- would like to avoid stimulants  - PT and OT     Closed fracture of one rib of left side  - incentive spirometer   -place Lidoderm patch as well as when necessary pain medication      Hypertension   -Stable continue medical management      - Holding Lasix and potassium due to decreased oral intake due to abdominal pain     Right breast nodule  -pt doesn't want mammogram since it won't change her decision of no treatment whatever the nodule is.    Tobacco abuse= NicoDerm      DVT proph: Lovenox 40    DISPO: rehab tomorrow    Ward Berry MD  [facility]  07/12/18  11:34 AM

## 2018-07-12 NOTE — PLAN OF CARE
Problem: Fall Risk (Adult)  Goal: Identify Related Risk Factors and Signs and Symptoms  Outcome: Outcome(s) achieved Date Met: 07/12/18    Goal: Absence of Fall  Outcome: Ongoing (interventions implemented as appropriate)      Problem: Patient Care Overview  Goal: Plan of Care Review  Outcome: Ongoing (interventions implemented as appropriate)   07/12/18 1611   Coping/Psychosocial   Plan of Care Reviewed With patient;spouse;family   Plan of Care Review   Progress improving   OTHER   Outcome Summary VSS; IVF continue; d/c abx; PT seen pt and walked; diet increased to GI/soft; c/o generalized pain treated w Tylenol     Goal: Individualization and Mutuality  Outcome: Ongoing (interventions implemented as appropriate)    Goal: Discharge Needs Assessment  Outcome: Ongoing (interventions implemented as appropriate)    Goal: Interprofessional Rounds/Family Conf  Outcome: Ongoing (interventions implemented as appropriate)      Problem: Skin Injury Risk (Adult)  Goal: Identify Related Risk Factors and Signs and Symptoms  Outcome: Outcome(s) achieved Date Met: 07/12/18    Goal: Skin Health and Integrity  Outcome: Ongoing (interventions implemented as appropriate)

## 2018-07-13 PROBLEM — K57.20 DIVERTICULITIS OF LARGE INTESTINE WITH ABSCESS WITHOUT BLEEDING: Status: RESOLVED | Noted: 2018-01-01 | Resolved: 2018-01-01

## 2018-07-13 NOTE — DISCHARGE SUMMARY
Name: Lauren Monreal  Age: 82 y.o.  Sex: female  :  1935  MRN: [id number]         Primary Care Physician: Héctor Abbott MD      Date of Admission:  7/10/2018  Date of Discharge:  2018      CHIEF COMPLAINT  Weakness - Generalized (WITH DECREASED APPETITE FOR THE PAST 3 DAYS; PT REPORTS FALLING OUT OF A CHAIR 3 DAYS AGO)      DISCHARGE DIAGNOSIS  Active Hospital Problems    Diagnosis Date Noted   • **Closed fracture of one rib of left side [S22.32XA] 07/10/2018   • Leukocytosis [D72.829] 2018   • Chronic respiratory failure with hypoxia (CMS/HCC) [J96.11] 2018   • Memory loss [R41.3] 2018   • Breast nodule [N63.0] 2018   • Fall [W19.XXXA] 07/10/2018   • Tobacco abuse [Z72.0] 07/10/2018   • Narcolepsy [G47.419] 07/10/2018   • COPD (chronic obstructive pulmonary disease) (CMS/HCC) [J44.9] 2018   • Hypertension [I10] 2016      Resolved Hospital Problems    Diagnosis Date Noted Date Resolved   • Diverticulitis of large intestine with abscess without bleeding [K57.20] 07/10/2018 2018       SECONDARY DIAGNOSES  Past Medical History:   Diagnosis Date   • Anxiety    • Arthritis    • Cancer (CMS/HCC)     bladder.  sees dr smith   • COPD (chronic obstructive pulmonary disease) (CMS/HCC)    • Diverticulitis    • Hyperlipidemia    • Hypertension    • Osteoporosis        CONSULTS   Consult Orders (all)     Start     Ordered    07/10/18 1714  Inpatient Neurology Consult  Once     Specialty:  Neurology  Provider:  Blu Vazquez MD    07/10/18 1714    07/10/18 171  Inpatient Colorectal Surgery Consult  Once     Specialty:  Colon and Rectal Surgery  Provider:  Leonie Can MD    07/10/18 171            PROCEDURES PERFORMED    EMERGENCY NONCONTRAST HEAD CT 07/10/2018    IMPRESSION:     1. No acute abnormality is seen. 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     2. The remainder of the head CT is normal.      CT CHEST WITHOUT CONTRAST; ABDOMEN, AND PELVIS WITH IV CONTRAST    IMPRESSION:  Impression:  1.  Findings most suggestive of acute diverticulitis involving the  ascending colon. A small rim-enhancing pericolonic fluid collection  contiguous with the vaginal cuff is concerning for a small abscess.  2.  Mild thickening of the descending colon which appears similar to  that seen on 8/24/2016. Findings concerning for mild coexisting colitis.  3.  1.6 cm well-circumscribed nodule within the right breast.  Correlation with mammography is recommended to exclude underlying  neoplasm.  4.  Right nephrolithiasis.  5.  Severe emphysema.     HOSPITAL COURSE    The patient is an 82-year-old female with history of COPD on oxygen who came to the hospital after falling and having left-sided rib pain.  He is diagnosed with a left-sided rib fracture secondary to a fall.  She was previously hospitalized for diverticulitis.  She had abdominal pain so CT is obtained showing acute diverticulitis.  She was placed on IV antibiotics and colorectal surgery saw her in consultation.  They felt like the symptoms were not related to diverticulitis and that the imaging was also not consistent with diverticulitis.  She has chronic pain on further discussion.  This pain has been present since starting her menses at the age of 13.  She did have one episode of leukocytosis of 11,000 but this was secondary to 3 doses of IV Solu-Medrol from the emergency room.  She was supposed to be on home oxygen but rarely uses it.  She does require 2 L nasal cannula here and I recommend at rehabilitation for this to continue.  Her pain has been overall well controlled without the need for narcotics.  I recommend continuing with  incentive spirometry at rehabilitation.  She was also incidentally found to have right sided breast nodule and I discussed mammogram with her.  She declined as she did not want any treatment in the matter what this nodule was.  The patient lives at home and there has been some question of decreasing memory over the past months or years.  I feel it is safe for the patient to go to rehabilitation and possibly long-term care due to her poor health and her 's poor health as well.  She is medically stable for discharge today.  PHYSICAL EXAM  Temp:  [97.8 °F (36.6 °C)-98.6 °F (37 °C)] 97.8 °F (36.6 °C)  Heart Rate:  [72-98] 80  Resp:  [16-24] 20  BP: (112-146)/(51-95) 143/78  Body mass index is 20.82 kg/m².  Physical Exam    Constitutional: No distress.   Elderly and frail appearing     HENT:   Head: Normocephalic and atraumatic.   Neck: No JVD present.   Cardiovascular: Normal rate and regular rhythm.  Exam reveals no friction rub.    No murmur heard.  Pulmonary/Chest: Effort normal and breath sounds normal. No stridor. No respiratory distress. She has no wheezes.   Mildly prolonged expiration     Abdominal: Soft. Bowel sounds are normal. She exhibits no distension. There is no tenderness. There is no guarding.   Genitourinary:   Genitourinary Comments: Small palpable right breast nodule about 11 o'clock   Musculoskeletal: She exhibits no edema or tenderness.   Neurological: She is alert.   Skin: She is not diaphoretic. No erythema. No pallor.   Psychiatric: She has a normal mood and affect. Her behavior is normal.   Vitals reviewed.  CONDITION ON DISCHARGE  Stable.      DISCHARGE DISPOSITION   Subacute Rehab      ALLERGIES  Allergies   Allergen Reactions   • Adhesive Tape    • Latex Itching         DISCHARGE MEDICATIONS     Your medication list      START taking these medications      Instructions Last Dose Given Next Dose Due   guaiFENesin 600 MG 12 hr tablet  Commonly known as:  MUCINEX      Take 2 tablets by  mouth Every 12 (Twelve) Hours.       lidocaine 5 %  Commonly known as:  LIDODERM      Place 1 patch on the skin Daily. Remove & Discard patch within 12 hours or as directed by MD       nicotine 21 MG/24HR patch  Commonly known as:  NICODERM CQ      Place 1 patch on the skin Daily.          CONTINUE taking these medications      Instructions Last Dose Given Next Dose Due   acetaminophen 650 MG 8 hr tablet  Commonly known as:  TYLENOL      Take 325 mg by mouth Every 6 (Six) Hours As Needed for Mild Pain .       cholecalciferol 1000 units tablet  Commonly known as:  VITAMIN D3      Take 1,500 Units by mouth daily.       CLARITIN-D 24 HOUR  MG per 24 hr tablet  Generic drug:  loratadine-pseudoephedrine      Take 1 tablet by mouth Daily As Needed for Allergies.       docusate sodium 100 MG capsule  Commonly known as:  COLACE      Take 100 mg by mouth 2 (Two) Times a Day As Needed.       escitalopram 5 MG tablet  Commonly known as:  LEXAPRO      Take 10 mg by mouth Daily.       furosemide 40 MG tablet  Commonly known as:  LASIX      Take 40 mg by mouth Daily.       lactobacillus acidophilus capsule capsule      Take 1 capsule by mouth daily.       linaclotide 290 MCG capsule capsule  Commonly known as:  LINZESS      Take 72 mcg by mouth Daily.       loperamide 2 MG capsule  Commonly known as:  IMODIUM      Take 2 mg by mouth 4 (Four) Times a Day As Needed for Diarrhea.       potassium chloride 10 MEQ CR capsule  Commonly known as:  MICRO-K      Take 10 mEq by mouth 2 (Two) Times a Day.       PROAIR  (90 Base) MCG/ACT inhaler  Generic drug:  albuterol      Inhale 1-2 puffs Every 4 (Four) Hours As Needed for Wheezing.       albuterol (2.5 MG/3ML) 0.083% nebulizer solution  Commonly known as:  PROVENTIL      Take 2.5 mg by nebulization Every 4 (Four) Hours As Needed for Wheezing.       simvastatin 10 MG tablet  Commonly known as:  ZOCOR      Take 10 mg by mouth every night.       SYMBICORT 160-4.5 MCG/ACT  inhaler  Generic drug:  budesonide-formoterol      Inhale 1-2 puffs 2 (Two) Times a Day.       tiotropium 18 MCG per inhalation capsule  Commonly known as:  SPIRIVA      Place 1 capsule into inhaler and inhale Daily.          STOP taking these medications    predniSONE 10 MG tablet  Commonly known as:  DELTASONE              Where to Get Your Medications      Information about where to get these medications is not yet available    Ask your nurse or doctor about these medications  · guaiFENesin 600 MG 12 hr tablet  · lidocaine 5 %  · nicotine 21 MG/24HR patch       Diet Instructions     Advance Diet As Tolerated            Activity Instructions     Activity as Tolerated         No future appointments.   Contact information for follow-up providers     Héctor Abbott MD Follow up in 2 week(s).    Specialty:  Family Medicine  Contact information:  [address] [place] [address] DRIVE  [address] [address] [phone]                   Contact information for after-discharge care     [facility]    Specialty:  Skilled Nursing Facility  Contact information:  [address] [place] [id number] [phone]                             TEST  RESULTS PENDING AT DISCHARGE  None       CODE STATUS  Code Status and Medical Interventions:   Ordered at: 07/10/18 1709     Code Status:    CPR     Medical Interventions (Level of Support Prior to Arrest):    Full     Comments:    PLEASE CLARIFY WITH PATIENT THAT THIS IS ACCURATE WITH WISHES OR LIVING WILL           Ward Berry MD  [facility]  07/13/18  11:06 AM      Time: greater than 30 minutes.

## 2018-07-13 NOTE — PROGRESS NOTES
Case Management Discharge Note    Final Note: Pt DC'd to skilled bed at [facility], family to transport. Call to Chelsi to notify of DC. JAMES Yan faxed DC summary. ANNIE Hurley called report.     Destination - Selection Complete     Service Request Status Selected Specialties Address Phone Number Fax Number    [facility] [address] [place] [id number] [phone]      Durable Medical Equipment     No service has been selected for the patient.      Dialysis/Infusion     No service has been selected for the patient.      Home Medical Care     No service has been selected for the patient.      Social Care     No service has been selected for the patient.        Ambulance: Yellow    Final Discharge Disposition Code: 03 - skilled nursing facility (SNF)

## 2018-07-13 NOTE — PLAN OF CARE
Problem: Fall Risk (Adult)  Goal: Absence of Fall  Outcome: Ongoing (interventions implemented as appropriate)      Problem: Patient Care Overview  Goal: Plan of Care Review  Outcome: Ongoing (interventions implemented as appropriate)   07/13/18 0536   Coping/Psychosocial   Plan of Care Reviewed With patient   Plan of Care Review   Progress improving   OTHER   Outcome Summary VSS. IVF continued. Required 2L while sleeping. SOA when getting up to [facility]. Possible dc today. Will continue to monitor.     Goal: Individualization and Mutuality  Outcome: Ongoing (interventions implemented as appropriate)    Goal: Discharge Needs Assessment  Outcome: Ongoing (interventions implemented as appropriate)    Goal: Interprofessional Rounds/Family Conf  Outcome: Ongoing (interventions implemented as appropriate)      Problem: Skin Injury Risk (Adult)  Goal: Skin Health and Integrity  Outcome: Ongoing (interventions implemented as appropriate)

## 2019-01-01 ENCOUNTER — APPOINTMENT (OUTPATIENT)
Dept: GENERAL RADIOLOGY | Facility: HOSPITAL | Age: 84
End: 2019-01-01

## 2019-01-01 ENCOUNTER — HOSPITAL ENCOUNTER (INPATIENT)
Facility: HOSPITAL | Age: 84
LOS: 5 days | End: 2019-01-17
Attending: EMERGENCY MEDICINE | Admitting: INTERNAL MEDICINE

## 2019-01-01 VITALS
TEMPERATURE: 98.9 F | SYSTOLIC BLOOD PRESSURE: 131 MMHG | HEIGHT: 62 IN | DIASTOLIC BLOOD PRESSURE: 71 MMHG | WEIGHT: 93.47 LBS | BODY MASS INDEX: 17.2 KG/M2 | OXYGEN SATURATION: 53 %

## 2019-01-01 DIAGNOSIS — J44.1 COPD EXACERBATION (HCC): ICD-10-CM

## 2019-01-01 DIAGNOSIS — J96.22 ACUTE ON CHRONIC RESPIRATORY FAILURE WITH HYPERCAPNIA (HCC): Primary | ICD-10-CM

## 2019-01-01 LAB
ALBUMIN SERPL-MCNC: 4.2 G/DL (ref 3.5–5.2)
ALBUMIN SERPL-MCNC: 4.4 G/DL (ref 3.5–5.2)
ALBUMIN/GLOB SERPL: 1.5 G/DL
ALBUMIN/GLOB SERPL: 1.8 G/DL
ALP SERPL-CCNC: 63 U/L (ref 39–117)
ALP SERPL-CCNC: 78 U/L (ref 39–117)
ALT SERPL W P-5'-P-CCNC: 10 U/L (ref 1–33)
ALT SERPL W P-5'-P-CCNC: 11 U/L (ref 1–33)
ANION GAP SERPL CALCULATED.3IONS-SCNC: 12.1 MMOL/L
ANION GAP SERPL CALCULATED.3IONS-SCNC: 12.9 MMOL/L
ANION GAP SERPL CALCULATED.3IONS-SCNC: 13.1 MMOL/L
ANION GAP SERPL CALCULATED.3IONS-SCNC: 13.3 MMOL/L
ANION GAP SERPL CALCULATED.3IONS-SCNC: 9.6 MMOL/L
ARTERIAL PATENCY WRIST A: POSITIVE
ARTERIAL PATENCY WRIST A: POSITIVE
AST SERPL-CCNC: 12 U/L (ref 1–32)
AST SERPL-CCNC: 16 U/L (ref 1–32)
ATMOSPHERIC PRESS: 752.2 MMHG
ATMOSPHERIC PRESS: 756.7 MMHG
B PARAPERT DNA SPEC QL NAA+PROBE: NOT DETECTED
B PERT DNA SPEC QL NAA+PROBE: NOT DETECTED
BASE EXCESS BLDA CALC-SCNC: 4.6 MMOL/L (ref 0–2)
BASE EXCESS BLDA CALC-SCNC: 6.4 MMOL/L (ref 0–2)
BASOPHILS # BLD AUTO: 0 10*3/MM3 (ref 0–0.2)
BASOPHILS # BLD AUTO: 0.02 10*3/MM3 (ref 0–0.2)
BASOPHILS NFR BLD AUTO: 0 % (ref 0–1.5)
BASOPHILS NFR BLD AUTO: 0.2 % (ref 0–1.5)
BDY SITE: ABNORMAL
BDY SITE: ABNORMAL
BILIRUB SERPL-MCNC: 0.3 MG/DL (ref 0.1–1.2)
BILIRUB SERPL-MCNC: 0.5 MG/DL (ref 0.1–1.2)
BUN BLD-MCNC: 24 MG/DL (ref 8–23)
BUN BLD-MCNC: 26 MG/DL (ref 8–23)
BUN BLD-MCNC: 27 MG/DL (ref 8–23)
BUN BLD-MCNC: 34 MG/DL (ref 8–23)
BUN BLD-MCNC: 36 MG/DL (ref 8–23)
BUN/CREAT SERPL: 38.1 (ref 7–25)
BUN/CREAT SERPL: 42.2 (ref 7–25)
BUN/CREAT SERPL: 49.1 (ref 7–25)
BUN/CREAT SERPL: 52.2 (ref 7–25)
BUN/CREAT SERPL: 60.7 (ref 7–25)
C PNEUM DNA NPH QL NAA+NON-PROBE: NOT DETECTED
CALCIUM SPEC-SCNC: 8.2 MG/DL (ref 8.6–10.5)
CALCIUM SPEC-SCNC: 9 MG/DL (ref 8.6–10.5)
CALCIUM SPEC-SCNC: 9.5 MG/DL (ref 8.6–10.5)
CALCIUM SPEC-SCNC: 9.7 MG/DL (ref 8.6–10.5)
CALCIUM SPEC-SCNC: 9.9 MG/DL (ref 8.6–10.5)
CHLORIDE SERPL-SCNC: 101 MMOL/L (ref 98–107)
CHLORIDE SERPL-SCNC: 102 MMOL/L (ref 98–107)
CHLORIDE SERPL-SCNC: 107 MMOL/L (ref 98–107)
CHLORIDE SERPL-SCNC: 110 MMOL/L (ref 98–107)
CHLORIDE SERPL-SCNC: 110 MMOL/L (ref 98–107)
CO2 SERPL-SCNC: 26.9 MMOL/L (ref 22–29)
CO2 SERPL-SCNC: 27.4 MMOL/L (ref 22–29)
CO2 SERPL-SCNC: 27.7 MMOL/L (ref 22–29)
CO2 SERPL-SCNC: 31.9 MMOL/L (ref 22–29)
CO2 SERPL-SCNC: 33.1 MMOL/L (ref 22–29)
CREAT BLD-MCNC: 0.53 MG/DL (ref 0.57–1)
CREAT BLD-MCNC: 0.56 MG/DL (ref 0.57–1)
CREAT BLD-MCNC: 0.63 MG/DL (ref 0.57–1)
CREAT BLD-MCNC: 0.64 MG/DL (ref 0.57–1)
CREAT BLD-MCNC: 0.69 MG/DL (ref 0.57–1)
DEPRECATED RDW RBC AUTO: 46.6 FL (ref 37–54)
DEPRECATED RDW RBC AUTO: 48.3 FL (ref 37–54)
DEPRECATED RDW RBC AUTO: 48.9 FL (ref 37–54)
DEPRECATED RDW RBC AUTO: 50 FL (ref 37–54)
DEPRECATED RDW RBC AUTO: 50.8 FL (ref 37–54)
EOSINOPHIL # BLD AUTO: 0 10*3/MM3 (ref 0–0.7)
EOSINOPHIL # BLD AUTO: 0 10*3/MM3 (ref 0–0.7)
EOSINOPHIL NFR BLD AUTO: 0 % (ref 0.3–6.2)
EOSINOPHIL NFR BLD AUTO: 0 % (ref 0.3–6.2)
ERYTHROCYTE [DISTWIDTH] IN BLOOD BY AUTOMATED COUNT: 13 % (ref 11.7–13)
ERYTHROCYTE [DISTWIDTH] IN BLOOD BY AUTOMATED COUNT: 13.3 % (ref 11.7–13)
ERYTHROCYTE [DISTWIDTH] IN BLOOD BY AUTOMATED COUNT: 13.4 % (ref 11.7–13)
ERYTHROCYTE [DISTWIDTH] IN BLOOD BY AUTOMATED COUNT: 13.5 % (ref 11.7–13)
ERYTHROCYTE [DISTWIDTH] IN BLOOD BY AUTOMATED COUNT: 13.5 % (ref 11.7–13)
FLUAV H1 2009 PAND RNA NPH QL NAA+PROBE: NOT DETECTED
FLUAV H1 HA GENE NPH QL NAA+PROBE: NOT DETECTED
FLUAV H3 RNA NPH QL NAA+PROBE: NOT DETECTED
FLUAV SUBTYP SPEC NAA+PROBE: NOT DETECTED
FLUBV RNA ISLT QL NAA+PROBE: NOT DETECTED
GAS FLOW AIRWAY: 2.5 LPM
GFR SERPL CREATININE-BSD FRML MDRD: 104 ML/MIN/1.73
GFR SERPL CREATININE-BSD FRML MDRD: 110 ML/MIN/1.73
GFR SERPL CREATININE-BSD FRML MDRD: 81 ML/MIN/1.73
GFR SERPL CREATININE-BSD FRML MDRD: 89 ML/MIN/1.73
GFR SERPL CREATININE-BSD FRML MDRD: 90 ML/MIN/1.73
GLOBULIN UR ELPH-MCNC: 2.3 GM/DL
GLOBULIN UR ELPH-MCNC: 3 GM/DL
GLUCOSE BLD-MCNC: 103 MG/DL (ref 65–99)
GLUCOSE BLD-MCNC: 125 MG/DL (ref 65–99)
GLUCOSE BLD-MCNC: 129 MG/DL (ref 65–99)
GLUCOSE BLD-MCNC: 152 MG/DL (ref 65–99)
GLUCOSE BLD-MCNC: 167 MG/DL (ref 65–99)
GLUCOSE BLDC GLUCOMTR-MCNC: 100 MG/DL (ref 70–130)
GLUCOSE BLDC GLUCOMTR-MCNC: 100 MG/DL (ref 70–130)
GLUCOSE BLDC GLUCOMTR-MCNC: 104 MG/DL (ref 70–130)
GLUCOSE BLDC GLUCOMTR-MCNC: 111 MG/DL (ref 70–130)
GLUCOSE BLDC GLUCOMTR-MCNC: 118 MG/DL (ref 70–130)
GLUCOSE BLDC GLUCOMTR-MCNC: 118 MG/DL (ref 70–130)
GLUCOSE BLDC GLUCOMTR-MCNC: 122 MG/DL (ref 70–130)
GLUCOSE BLDC GLUCOMTR-MCNC: 127 MG/DL (ref 70–130)
GLUCOSE BLDC GLUCOMTR-MCNC: 129 MG/DL (ref 70–130)
GLUCOSE BLDC GLUCOMTR-MCNC: 130 MG/DL (ref 70–130)
GLUCOSE BLDC GLUCOMTR-MCNC: 136 MG/DL (ref 70–130)
GLUCOSE BLDC GLUCOMTR-MCNC: 143 MG/DL (ref 70–130)
GLUCOSE BLDC GLUCOMTR-MCNC: 146 MG/DL (ref 70–130)
GLUCOSE BLDC GLUCOMTR-MCNC: 147 MG/DL (ref 70–130)
GLUCOSE BLDC GLUCOMTR-MCNC: 154 MG/DL (ref 70–130)
GLUCOSE BLDC GLUCOMTR-MCNC: 161 MG/DL (ref 70–130)
GLUCOSE BLDC GLUCOMTR-MCNC: 174 MG/DL (ref 70–130)
GLUCOSE BLDC GLUCOMTR-MCNC: 95 MG/DL (ref 70–130)
HADV DNA SPEC NAA+PROBE: NOT DETECTED
HCO3 BLDA-SCNC: 33.1 MMOL/L (ref 22–28)
HCO3 BLDA-SCNC: 34.2 MMOL/L (ref 22–28)
HCOV 229E RNA SPEC QL NAA+PROBE: NOT DETECTED
HCOV HKU1 RNA SPEC QL NAA+PROBE: NOT DETECTED
HCOV NL63 RNA SPEC QL NAA+PROBE: NOT DETECTED
HCOV OC43 RNA SPEC QL NAA+PROBE: NOT DETECTED
HCT VFR BLD AUTO: 38.3 % (ref 35.6–45.5)
HCT VFR BLD AUTO: 39.3 % (ref 35.6–45.5)
HCT VFR BLD AUTO: 40.3 % (ref 35.6–45.5)
HCT VFR BLD AUTO: 40.4 % (ref 35.6–45.5)
HCT VFR BLD AUTO: 45 % (ref 35.6–45.5)
HGB BLD-MCNC: 11.8 G/DL (ref 11.9–15.5)
HGB BLD-MCNC: 12.1 G/DL (ref 11.9–15.5)
HGB BLD-MCNC: 12.2 G/DL (ref 11.9–15.5)
HGB BLD-MCNC: 12.2 G/DL (ref 11.9–15.5)
HGB BLD-MCNC: 14.6 G/DL (ref 11.9–15.5)
HMPV RNA NPH QL NAA+NON-PROBE: NOT DETECTED
HOLD SPECIMEN: NORMAL
HOLD SPECIMEN: NORMAL
HOROWITZ INDEX BLD+IHG-RTO: 35 %
HPIV1 RNA SPEC QL NAA+PROBE: NOT DETECTED
HPIV2 RNA SPEC QL NAA+PROBE: NOT DETECTED
HPIV3 RNA NPH QL NAA+PROBE: NOT DETECTED
HPIV4 P GENE NPH QL NAA+PROBE: NOT DETECTED
IMM GRANULOCYTES # BLD AUTO: 0 10*3/MM3 (ref 0–0.03)
IMM GRANULOCYTES # BLD AUTO: 0.03 10*3/MM3 (ref 0–0.03)
IMM GRANULOCYTES NFR BLD AUTO: 0 % (ref 0–0.5)
IMM GRANULOCYTES NFR BLD AUTO: 0.3 % (ref 0–0.5)
LYMPHOCYTES # BLD AUTO: 0.42 10*3/MM3 (ref 0.9–4.8)
LYMPHOCYTES # BLD AUTO: 1.46 10*3/MM3 (ref 0.9–4.8)
LYMPHOCYTES NFR BLD AUTO: 12.2 % (ref 19.6–45.3)
LYMPHOCYTES NFR BLD AUTO: 6.4 % (ref 19.6–45.3)
M PNEUMO IGG SER IA-ACNC: NOT DETECTED
MAGNESIUM SERPL-MCNC: 2.1 MG/DL (ref 1.6–2.4)
MCH RBC QN AUTO: 30.4 PG (ref 26.9–32)
MCH RBC QN AUTO: 30.6 PG (ref 26.9–32)
MCH RBC QN AUTO: 30.8 PG (ref 26.9–32)
MCH RBC QN AUTO: 30.9 PG (ref 26.9–32)
MCH RBC QN AUTO: 31.6 PG (ref 26.9–32)
MCHC RBC AUTO-ENTMCNC: 30.2 G/DL (ref 32.4–36.3)
MCHC RBC AUTO-ENTMCNC: 30.3 G/DL (ref 32.4–36.3)
MCHC RBC AUTO-ENTMCNC: 30.8 G/DL (ref 32.4–36.3)
MCHC RBC AUTO-ENTMCNC: 30.8 G/DL (ref 32.4–36.3)
MCHC RBC AUTO-ENTMCNC: 32.4 G/DL (ref 32.4–36.3)
MCV RBC AUTO: 100.3 FL (ref 80.5–98.2)
MCV RBC AUTO: 100.5 FL (ref 80.5–98.2)
MCV RBC AUTO: 102 FL (ref 80.5–98.2)
MCV RBC AUTO: 97.4 FL (ref 80.5–98.2)
MCV RBC AUTO: 99.5 FL (ref 80.5–98.2)
MODALITY: ABNORMAL
MODALITY: ABNORMAL
MONOCYTES # BLD AUTO: 0.27 10*3/MM3 (ref 0.2–1.2)
MONOCYTES # BLD AUTO: 1.58 10*3/MM3 (ref 0.2–1.2)
MONOCYTES NFR BLD AUTO: 13.2 % (ref 5–12)
MONOCYTES NFR BLD AUTO: 4.1 % (ref 5–12)
NEUTROPHILS # BLD AUTO: 5.83 10*3/MM3 (ref 1.9–8.1)
NEUTROPHILS # BLD AUTO: 8.94 10*3/MM3 (ref 1.9–8.1)
NEUTROPHILS NFR BLD AUTO: 74.4 % (ref 42.7–76)
NEUTROPHILS NFR BLD AUTO: 89.5 % (ref 42.7–76)
NT-PROBNP SERPL-MCNC: 1209 PG/ML (ref 0–1800)
O2 A-A PPRESDIFF RESPIRATORY: 0.4 MMHG
PCO2 BLDA: 55.3 MM HG (ref 35–45)
PCO2 BLDA: 74.2 MM HG (ref 35–45)
PH BLDA: 7.27 PH UNITS (ref 7.35–7.45)
PH BLDA: 7.38 PH UNITS (ref 7.35–7.45)
PLATELET # BLD AUTO: 150 10*3/MM3 (ref 140–500)
PLATELET # BLD AUTO: 169 10*3/MM3 (ref 140–500)
PLATELET # BLD AUTO: 174 10*3/MM3 (ref 140–500)
PLATELET # BLD AUTO: 181 10*3/MM3 (ref 140–500)
PLATELET # BLD AUTO: 216 10*3/MM3 (ref 140–500)
PMV BLD AUTO: 10.9 FL (ref 6–12)
PMV BLD AUTO: 11 FL (ref 6–12)
PMV BLD AUTO: 11 FL (ref 6–12)
PMV BLD AUTO: 11.1 FL (ref 6–12)
PMV BLD AUTO: 11.2 FL (ref 6–12)
PO2 BLDA: 81.2 MM HG (ref 80–100)
PO2 BLDA: 83 MM HG (ref 80–100)
POTASSIUM BLD-SCNC: 4.1 MMOL/L (ref 3.5–5.2)
POTASSIUM BLD-SCNC: 4.3 MMOL/L (ref 3.5–5.2)
POTASSIUM BLD-SCNC: 4.4 MMOL/L (ref 3.5–5.2)
PROT SERPL-MCNC: 6.5 G/DL (ref 6–8.5)
PROT SERPL-MCNC: 7.4 G/DL (ref 6–8.5)
RBC # BLD AUTO: 3.85 10*6/MM3 (ref 3.9–5.2)
RBC # BLD AUTO: 3.92 10*6/MM3 (ref 3.9–5.2)
RBC # BLD AUTO: 3.96 10*6/MM3 (ref 3.9–5.2)
RBC # BLD AUTO: 4.01 10*6/MM3 (ref 3.9–5.2)
RBC # BLD AUTO: 4.62 10*6/MM3 (ref 3.9–5.2)
RHINOVIRUS RNA SPEC NAA+PROBE: NOT DETECTED
RSV RNA NPH QL NAA+NON-PROBE: DETECTED
SAO2 % BLDCOA: 93.4 % (ref 92–99)
SAO2 % BLDCOA: 95.7 % (ref 92–99)
SET MECH RESP RATE: 22
SODIUM BLD-SCNC: 146 MMOL/L (ref 136–145)
SODIUM BLD-SCNC: 147 MMOL/L (ref 136–145)
SODIUM BLD-SCNC: 147 MMOL/L (ref 136–145)
SODIUM BLD-SCNC: 148 MMOL/L (ref 136–145)
SODIUM BLD-SCNC: 150 MMOL/L (ref 136–145)
TOTAL RATE: 22 BREATHS/MINUTE
TOTAL RATE: 24 BREATHS/MINUTE
TROPONIN T SERPL-MCNC: <0.01 NG/ML (ref 0–0.03)
VT ON VENT VENT: 326 ML
WBC NRBC COR # BLD: 12 10*3/MM3 (ref 4.5–10.7)
WBC NRBC COR # BLD: 6.52 10*3/MM3 (ref 4.5–10.7)
WBC NRBC COR # BLD: 6.71 10*3/MM3 (ref 4.5–10.7)
WBC NRBC COR # BLD: 7.07 10*3/MM3 (ref 4.5–10.7)
WBC NRBC COR # BLD: 7.7 10*3/MM3 (ref 4.5–10.7)
WHOLE BLOOD HOLD SPECIMEN: NORMAL
WHOLE BLOOD HOLD SPECIMEN: NORMAL

## 2019-01-01 PROCEDURE — 94799 UNLISTED PULMONARY SVC/PX: CPT

## 2019-01-01 PROCEDURE — 82962 GLUCOSE BLOOD TEST: CPT

## 2019-01-01 PROCEDURE — 80053 COMPREHEN METABOLIC PANEL: CPT | Performed by: INTERNAL MEDICINE

## 2019-01-01 PROCEDURE — 63710000001 INSULIN LISPRO (HUMAN) PER 5 UNITS: Performed by: INTERNAL MEDICINE

## 2019-01-01 PROCEDURE — 85025 COMPLETE CBC W/AUTO DIFF WBC: CPT | Performed by: INTERNAL MEDICINE

## 2019-01-01 PROCEDURE — 25010000002 ENOXAPARIN PER 10 MG: Performed by: INTERNAL MEDICINE

## 2019-01-01 PROCEDURE — 80053 COMPREHEN METABOLIC PANEL: CPT | Performed by: EMERGENCY MEDICINE

## 2019-01-01 PROCEDURE — 93005 ELECTROCARDIOGRAM TRACING: CPT | Performed by: EMERGENCY MEDICINE

## 2019-01-01 PROCEDURE — 87633 RESP VIRUS 12-25 TARGETS: CPT | Performed by: INTERNAL MEDICINE

## 2019-01-01 PROCEDURE — 80048 BASIC METABOLIC PNL TOTAL CA: CPT | Performed by: INTERNAL MEDICINE

## 2019-01-01 PROCEDURE — 36600 WITHDRAWAL OF ARTERIAL BLOOD: CPT

## 2019-01-01 PROCEDURE — 25010000002 METHYLPREDNISOLONE PER 40 MG: Performed by: INTERNAL MEDICINE

## 2019-01-01 PROCEDURE — 84484 ASSAY OF TROPONIN QUANT: CPT | Performed by: EMERGENCY MEDICINE

## 2019-01-01 PROCEDURE — 94660 CPAP INITIATION&MGMT: CPT

## 2019-01-01 PROCEDURE — 85027 COMPLETE CBC AUTOMATED: CPT | Performed by: INTERNAL MEDICINE

## 2019-01-01 PROCEDURE — 82803 BLOOD GASES ANY COMBINATION: CPT

## 2019-01-01 PROCEDURE — 99291 CRITICAL CARE FIRST HOUR: CPT

## 2019-01-01 PROCEDURE — 94640 AIRWAY INHALATION TREATMENT: CPT

## 2019-01-01 PROCEDURE — 93005 ELECTROCARDIOGRAM TRACING: CPT | Performed by: INTERNAL MEDICINE

## 2019-01-01 PROCEDURE — 71045 X-RAY EXAM CHEST 1 VIEW: CPT

## 2019-01-01 PROCEDURE — 25010000002 MORPHINE (PF) 10 MG/ML SOLUTION: Performed by: INTERNAL MEDICINE

## 2019-01-01 PROCEDURE — 87798 DETECT AGENT NOS DNA AMP: CPT | Performed by: INTERNAL MEDICINE

## 2019-01-01 PROCEDURE — 93010 ELECTROCARDIOGRAM REPORT: CPT | Performed by: INTERNAL MEDICINE

## 2019-01-01 PROCEDURE — 83735 ASSAY OF MAGNESIUM: CPT | Performed by: INTERNAL MEDICINE

## 2019-01-01 PROCEDURE — 83880 ASSAY OF NATRIURETIC PEPTIDE: CPT | Performed by: EMERGENCY MEDICINE

## 2019-01-01 PROCEDURE — 25010000002 HYDRALAZINE PER 20 MG: Performed by: INTERNAL MEDICINE

## 2019-01-01 PROCEDURE — 87486 CHLMYD PNEUM DNA AMP PROBE: CPT | Performed by: INTERNAL MEDICINE

## 2019-01-01 PROCEDURE — 85025 COMPLETE CBC W/AUTO DIFF WBC: CPT | Performed by: EMERGENCY MEDICINE

## 2019-01-01 PROCEDURE — 36415 COLL VENOUS BLD VENIPUNCTURE: CPT | Performed by: INTERNAL MEDICINE

## 2019-01-01 PROCEDURE — 25010000002 LORAZEPAM PER 2 MG: Performed by: INTERNAL MEDICINE

## 2019-01-01 PROCEDURE — 25010000002 HYDROMORPHONE 1 MG/ML SOLUTION: Performed by: INTERNAL MEDICINE

## 2019-01-01 PROCEDURE — 87581 M.PNEUMON DNA AMP PROBE: CPT | Performed by: INTERNAL MEDICINE

## 2019-01-01 RX ORDER — LORAZEPAM 2 MG/ML
0.5 INJECTION INTRAMUSCULAR EVERY 4 HOURS PRN
Status: DISCONTINUED | OUTPATIENT
Start: 2019-01-01 | End: 2019-01-01

## 2019-01-01 RX ORDER — IPRATROPIUM BROMIDE AND ALBUTEROL SULFATE 2.5; .5 MG/3ML; MG/3ML
3 SOLUTION RESPIRATORY (INHALATION)
Status: DISCONTINUED | OUTPATIENT
Start: 2019-01-01 | End: 2019-01-01

## 2019-01-01 RX ORDER — HYDRALAZINE HYDROCHLORIDE 20 MG/ML
20 INJECTION INTRAMUSCULAR; INTRAVENOUS EVERY 4 HOURS PRN
Status: DISCONTINUED | OUTPATIENT
Start: 2019-01-01 | End: 2019-01-01 | Stop reason: HOSPADM

## 2019-01-01 RX ORDER — DEXTROSE MONOHYDRATE 25 G/50ML
25 INJECTION, SOLUTION INTRAVENOUS
Status: DISCONTINUED | OUTPATIENT
Start: 2019-01-01 | End: 2019-01-01

## 2019-01-01 RX ORDER — METHYLPREDNISOLONE SODIUM SUCCINATE 40 MG/ML
40 INJECTION, POWDER, LYOPHILIZED, FOR SOLUTION INTRAMUSCULAR; INTRAVENOUS EVERY 12 HOURS
Status: DISCONTINUED | OUTPATIENT
Start: 2019-01-01 | End: 2019-01-01

## 2019-01-01 RX ORDER — DIPHENHYDRAMINE HCL 25 MG
25 CAPSULE ORAL EVERY 6 HOURS PRN
Status: DISCONTINUED | OUTPATIENT
Start: 2019-01-01 | End: 2019-01-01 | Stop reason: HOSPADM

## 2019-01-01 RX ORDER — POTASSIUM CHLORIDE 7.45 MG/ML
10 INJECTION INTRAVENOUS
Status: DISCONTINUED | OUTPATIENT
Start: 2019-01-01 | End: 2019-01-01

## 2019-01-01 RX ORDER — ACETAMINOPHEN 325 MG/1
650 TABLET ORAL EVERY 6 HOURS PRN
COMMUNITY

## 2019-01-01 RX ORDER — MORPHINE SULFATE 2 MG/ML
4 INJECTION, SOLUTION INTRAMUSCULAR; INTRAVENOUS
Status: DISCONTINUED | OUTPATIENT
Start: 2019-01-01 | End: 2019-01-01 | Stop reason: HOSPADM

## 2019-01-01 RX ORDER — SODIUM CHLORIDE 0.9 % (FLUSH) 0.9 %
3-10 SYRINGE (ML) INJECTION AS NEEDED
Status: DISCONTINUED | OUTPATIENT
Start: 2019-01-01 | End: 2019-01-01

## 2019-01-01 RX ORDER — POTASSIUM CHLORIDE 1.5 G/1.77G
40 POWDER, FOR SOLUTION ORAL AS NEEDED
Status: DISCONTINUED | OUTPATIENT
Start: 2019-01-01 | End: 2019-01-01

## 2019-01-01 RX ORDER — NICOTINE POLACRILEX 4 MG
15 LOZENGE BUCCAL
Status: DISCONTINUED | OUTPATIENT
Start: 2019-01-01 | End: 2019-01-01

## 2019-01-01 RX ORDER — ALBUTEROL SULFATE 2.5 MG/3ML
2.5 SOLUTION RESPIRATORY (INHALATION) ONCE
Status: COMPLETED | OUTPATIENT
Start: 2019-01-01 | End: 2019-01-01

## 2019-01-01 RX ORDER — DEXTROSE MONOHYDRATE 50 MG/ML
50 INJECTION, SOLUTION INTRAVENOUS CONTINUOUS
Status: DISCONTINUED | OUTPATIENT
Start: 2019-01-01 | End: 2019-01-01

## 2019-01-01 RX ORDER — NICOTINE 21 MG/24HR
1 PATCH, TRANSDERMAL 24 HOURS TRANSDERMAL
Status: DISCONTINUED | OUTPATIENT
Start: 2019-01-01 | End: 2019-01-01 | Stop reason: HOSPADM

## 2019-01-01 RX ORDER — LORAZEPAM 2 MG/ML
0.5 INJECTION INTRAMUSCULAR
Status: DISCONTINUED | OUTPATIENT
Start: 2019-01-01 | End: 2019-01-01 | Stop reason: HOSPADM

## 2019-01-01 RX ORDER — LORAZEPAM 2 MG/ML
0.5 CONCENTRATE ORAL
Status: DISCONTINUED | OUTPATIENT
Start: 2019-01-01 | End: 2019-01-01 | Stop reason: HOSPADM

## 2019-01-01 RX ORDER — ACETAMINOPHEN 325 MG/1
650 TABLET ORAL EVERY 4 HOURS PRN
Status: DISCONTINUED | OUTPATIENT
Start: 2019-01-01 | End: 2019-01-01 | Stop reason: HOSPADM

## 2019-01-01 RX ORDER — LORAZEPAM 2 MG/ML
2 CONCENTRATE ORAL
Status: DISCONTINUED | OUTPATIENT
Start: 2019-01-01 | End: 2019-01-01 | Stop reason: HOSPADM

## 2019-01-01 RX ORDER — HALOPERIDOL 1 MG/1
1 TABLET ORAL EVERY 6 HOURS PRN
Status: DISCONTINUED | OUTPATIENT
Start: 2019-01-01 | End: 2019-01-01 | Stop reason: HOSPADM

## 2019-01-01 RX ORDER — DIPHENHYDRAMINE HYDROCHLORIDE 50 MG/ML
25 INJECTION INTRAMUSCULAR; INTRAVENOUS EVERY 6 HOURS PRN
Status: DISCONTINUED | OUTPATIENT
Start: 2019-01-01 | End: 2019-01-01 | Stop reason: HOSPADM

## 2019-01-01 RX ORDER — SERTRALINE HYDROCHLORIDE 25 MG/1
25 TABLET, FILM COATED ORAL DAILY
Status: DISCONTINUED | OUTPATIENT
Start: 2019-01-01 | End: 2019-01-01

## 2019-01-01 RX ORDER — ACETAMINOPHEN 160 MG/5ML
650 SOLUTION ORAL EVERY 4 HOURS PRN
Status: DISCONTINUED | OUTPATIENT
Start: 2019-01-01 | End: 2019-01-01 | Stop reason: HOSPADM

## 2019-01-01 RX ORDER — MORPHINE SULFATE 2 MG/ML
2 INJECTION, SOLUTION INTRAMUSCULAR; INTRAVENOUS
Status: DISCONTINUED | OUTPATIENT
Start: 2019-01-01 | End: 2019-01-01 | Stop reason: HOSPADM

## 2019-01-01 RX ORDER — IPRATROPIUM BROMIDE AND ALBUTEROL SULFATE 2.5; .5 MG/3ML; MG/3ML
3 SOLUTION RESPIRATORY (INHALATION) ONCE
Status: COMPLETED | OUTPATIENT
Start: 2019-01-01 | End: 2019-01-01

## 2019-01-01 RX ORDER — MORPHINE SULFATE 10 MG/ML
6 INJECTION INTRAMUSCULAR; INTRAVENOUS; SUBCUTANEOUS
Status: DISCONTINUED | OUTPATIENT
Start: 2019-01-01 | End: 2019-01-01 | Stop reason: HOSPADM

## 2019-01-01 RX ORDER — GLYCOPYRROLATE 0.2 MG/ML
0.2 INJECTION INTRAMUSCULAR; INTRAVENOUS
Status: DISCONTINUED | OUTPATIENT
Start: 2019-01-01 | End: 2019-01-01 | Stop reason: HOSPADM

## 2019-01-01 RX ORDER — MORPHINE SULFATE 20 MG/ML
10 SOLUTION ORAL
Status: DISCONTINUED | OUTPATIENT
Start: 2019-01-01 | End: 2019-01-01 | Stop reason: HOSPADM

## 2019-01-01 RX ORDER — SERTRALINE HYDROCHLORIDE 25 MG/1
25 TABLET, FILM COATED ORAL DAILY
COMMUNITY

## 2019-01-01 RX ORDER — ALPRAZOLAM 0.25 MG/1
0.25 TABLET ORAL EVERY 4 HOURS PRN
Status: DISCONTINUED | OUTPATIENT
Start: 2019-01-01 | End: 2019-01-01

## 2019-01-01 RX ORDER — SODIUM CHLORIDE 0.9 % (FLUSH) 0.9 %
3 SYRINGE (ML) INJECTION EVERY 12 HOURS SCHEDULED
Status: DISCONTINUED | OUTPATIENT
Start: 2019-01-01 | End: 2019-01-01

## 2019-01-01 RX ORDER — ACETAMINOPHEN 650 MG/1
650 SUPPOSITORY RECTAL EVERY 4 HOURS PRN
Status: DISCONTINUED | OUTPATIENT
Start: 2019-01-01 | End: 2019-01-01 | Stop reason: HOSPADM

## 2019-01-01 RX ORDER — METHYLPREDNISOLONE SODIUM SUCCINATE 40 MG/ML
40 INJECTION, POWDER, LYOPHILIZED, FOR SOLUTION INTRAMUSCULAR; INTRAVENOUS EVERY 8 HOURS
Status: DISCONTINUED | OUTPATIENT
Start: 2019-01-01 | End: 2019-01-01

## 2019-01-01 RX ORDER — SODIUM CHLORIDE 9 MG/ML
75 INJECTION, SOLUTION INTRAVENOUS CONTINUOUS
Status: ACTIVE | OUTPATIENT
Start: 2019-01-01 | End: 2019-01-01

## 2019-01-01 RX ORDER — MORPHINE SULFATE 20 MG/ML
20 SOLUTION ORAL
Status: DISCONTINUED | OUTPATIENT
Start: 2019-01-01 | End: 2019-01-01 | Stop reason: HOSPADM

## 2019-01-01 RX ORDER — POTASSIUM CHLORIDE 750 MG/1
40 CAPSULE, EXTENDED RELEASE ORAL AS NEEDED
Status: DISCONTINUED | OUTPATIENT
Start: 2019-01-01 | End: 2019-01-01

## 2019-01-01 RX ORDER — LORAZEPAM 2 MG/ML
1 INJECTION INTRAMUSCULAR
Status: DISCONTINUED | OUTPATIENT
Start: 2019-01-01 | End: 2019-01-01 | Stop reason: HOSPADM

## 2019-01-01 RX ORDER — DIPHENOXYLATE HYDROCHLORIDE AND ATROPINE SULFATE 2.5; .025 MG/1; MG/1
1 TABLET ORAL
Status: DISCONTINUED | OUTPATIENT
Start: 2019-01-01 | End: 2019-01-01 | Stop reason: HOSPADM

## 2019-01-01 RX ORDER — HYDROMORPHONE HYDROCHLORIDE 1 MG/ML
0.5 INJECTION, SOLUTION INTRAMUSCULAR; INTRAVENOUS; SUBCUTANEOUS
Status: DISCONTINUED | OUTPATIENT
Start: 2019-01-01 | End: 2019-01-01 | Stop reason: HOSPADM

## 2019-01-01 RX ORDER — HALOPERIDOL 2 MG/ML
1 SOLUTION ORAL EVERY 6 HOURS PRN
Status: DISCONTINUED | OUTPATIENT
Start: 2019-01-01 | End: 2019-01-01 | Stop reason: HOSPADM

## 2019-01-01 RX ORDER — HALOPERIDOL 5 MG/ML
1 INJECTION INTRAMUSCULAR EVERY 6 HOURS PRN
Status: DISCONTINUED | OUTPATIENT
Start: 2019-01-01 | End: 2019-01-01 | Stop reason: HOSPADM

## 2019-01-01 RX ORDER — SENNA AND DOCUSATE SODIUM 50; 8.6 MG/1; MG/1
2 TABLET, FILM COATED ORAL NIGHTLY
Status: DISCONTINUED | OUTPATIENT
Start: 2019-01-01 | End: 2019-01-01

## 2019-01-01 RX ORDER — LANOLIN ALCOHOL/MO/W.PET/CERES
1000 CREAM (GRAM) TOPICAL EVERY OTHER DAY
COMMUNITY

## 2019-01-01 RX ORDER — SODIUM CHLORIDE 9 MG/ML
50 INJECTION, SOLUTION INTRAVENOUS CONTINUOUS
Status: DISCONTINUED | OUTPATIENT
Start: 2019-01-01 | End: 2019-01-01

## 2019-01-01 RX ORDER — GLYCOPYRROLATE 0.2 MG/ML
0.4 INJECTION INTRAMUSCULAR; INTRAVENOUS
Status: DISCONTINUED | OUTPATIENT
Start: 2019-01-01 | End: 2019-01-01 | Stop reason: HOSPADM

## 2019-01-01 RX ORDER — MORPHINE SULFATE 20 MG/ML
5 SOLUTION ORAL
Status: DISCONTINUED | OUTPATIENT
Start: 2019-01-01 | End: 2019-01-01 | Stop reason: HOSPADM

## 2019-01-01 RX ORDER — METHYLPREDNISOLONE SODIUM SUCCINATE 125 MG/2ML
125 INJECTION, POWDER, LYOPHILIZED, FOR SOLUTION INTRAMUSCULAR; INTRAVENOUS ONCE
Status: DISCONTINUED | OUTPATIENT
Start: 2019-01-01 | End: 2019-01-01

## 2019-01-01 RX ORDER — SODIUM CHLORIDE 0.9 % (FLUSH) 0.9 %
10 SYRINGE (ML) INJECTION AS NEEDED
Status: DISCONTINUED | OUTPATIENT
Start: 2019-01-01 | End: 2019-01-01 | Stop reason: HOSPADM

## 2019-01-01 RX ORDER — LORAZEPAM 2 MG/ML
2 INJECTION INTRAMUSCULAR
Status: DISCONTINUED | OUTPATIENT
Start: 2019-01-01 | End: 2019-01-01 | Stop reason: HOSPADM

## 2019-01-01 RX ORDER — POLYETHYLENE GLYCOL 3350 17 G/17G
17 POWDER, FOR SOLUTION ORAL DAILY PRN
COMMUNITY

## 2019-01-01 RX ORDER — LORAZEPAM 2 MG/ML
1 CONCENTRATE ORAL
Status: DISCONTINUED | OUTPATIENT
Start: 2019-01-01 | End: 2019-01-01 | Stop reason: HOSPADM

## 2019-01-01 RX ADMIN — SODIUM CHLORIDE, PRESERVATIVE FREE 3 ML: 5 INJECTION INTRAVENOUS at 23:41

## 2019-01-01 RX ADMIN — IPRATROPIUM BROMIDE AND ALBUTEROL SULFATE 3 ML: 2.5; .5 SOLUTION RESPIRATORY (INHALATION) at 07:05

## 2019-01-01 RX ADMIN — DEXMEDETOMIDINE HYDROCHLORIDE 0.2 MCG/KG/HR: 100 INJECTION, SOLUTION, CONCENTRATE INTRAVENOUS at 12:25

## 2019-01-01 RX ADMIN — ENOXAPARIN SODIUM 40 MG: 40 INJECTION SUBCUTANEOUS at 08:26

## 2019-01-01 RX ADMIN — DEXMEDETOMIDINE HYDROCHLORIDE 0.6 MCG/KG/HR: 100 INJECTION, SOLUTION, CONCENTRATE INTRAVENOUS at 05:44

## 2019-01-01 RX ADMIN — NICOTINE 1 PATCH: 21 PATCH, EXTENDED RELEASE TRANSDERMAL at 08:26

## 2019-01-01 RX ADMIN — IPRATROPIUM BROMIDE AND ALBUTEROL SULFATE 3 ML: 2.5; .5 SOLUTION RESPIRATORY (INHALATION) at 06:46

## 2019-01-01 RX ADMIN — IPRATROPIUM BROMIDE AND ALBUTEROL SULFATE 3 ML: 2.5; .5 SOLUTION RESPIRATORY (INHALATION) at 10:51

## 2019-01-01 RX ADMIN — IPRATROPIUM BROMIDE AND ALBUTEROL SULFATE 3 ML: 2.5; .5 SOLUTION RESPIRATORY (INHALATION) at 15:21

## 2019-01-01 RX ADMIN — NICOTINE 1 PATCH: 21 PATCH, EXTENDED RELEASE TRANSDERMAL at 09:08

## 2019-01-01 RX ADMIN — LORAZEPAM 1 MG: 2 INJECTION INTRAMUSCULAR; INTRAVENOUS at 21:10

## 2019-01-01 RX ADMIN — NICOTINE 1 PATCH: 21 PATCH, EXTENDED RELEASE TRANSDERMAL at 09:00

## 2019-01-01 RX ADMIN — ENOXAPARIN SODIUM 30 MG: 30 INJECTION SUBCUTANEOUS at 09:08

## 2019-01-01 RX ADMIN — METHYLPREDNISOLONE SODIUM SUCCINATE 40 MG: 40 INJECTION, POWDER, LYOPHILIZED, FOR SOLUTION INTRAMUSCULAR; INTRAVENOUS at 21:48

## 2019-01-01 RX ADMIN — METHYLPREDNISOLONE SODIUM SUCCINATE 40 MG: 40 INJECTION, POWDER, LYOPHILIZED, FOR SOLUTION INTRAMUSCULAR; INTRAVENOUS at 06:40

## 2019-01-01 RX ADMIN — IPRATROPIUM BROMIDE AND ALBUTEROL SULFATE 3 ML: 2.5; .5 SOLUTION RESPIRATORY (INHALATION) at 06:48

## 2019-01-01 RX ADMIN — LORAZEPAM 0.5 MG: 2 INJECTION INTRAMUSCULAR; INTRAVENOUS at 08:55

## 2019-01-01 RX ADMIN — DEXTROSE MONOHYDRATE 50 ML/HR: 50 INJECTION, SOLUTION INTRAVENOUS at 09:48

## 2019-01-01 RX ADMIN — IPRATROPIUM BROMIDE AND ALBUTEROL SULFATE 3 ML: 2.5; .5 SOLUTION RESPIRATORY (INHALATION) at 19:15

## 2019-01-01 RX ADMIN — MORPHINE SULFATE 4 MG: 10 INJECTION INTRAVENOUS at 13:13

## 2019-01-01 RX ADMIN — METHYLPREDNISOLONE SODIUM SUCCINATE 40 MG: 40 INJECTION, POWDER, LYOPHILIZED, FOR SOLUTION INTRAMUSCULAR; INTRAVENOUS at 15:51

## 2019-01-01 RX ADMIN — ENOXAPARIN SODIUM 30 MG: 30 INJECTION SUBCUTANEOUS at 08:55

## 2019-01-01 RX ADMIN — NICOTINE 1 PATCH: 21 PATCH, EXTENDED RELEASE TRANSDERMAL at 08:31

## 2019-01-01 RX ADMIN — ALBUTEROL SULFATE 2.5 MG: 2.5 SOLUTION RESPIRATORY (INHALATION) at 21:06

## 2019-01-01 RX ADMIN — IPRATROPIUM BROMIDE AND ALBUTEROL SULFATE 3 ML: 2.5; .5 SOLUTION RESPIRATORY (INHALATION) at 11:33

## 2019-01-01 RX ADMIN — DEXMEDETOMIDINE HYDROCHLORIDE 0.5 MCG/KG/HR: 100 INJECTION, SOLUTION, CONCENTRATE INTRAVENOUS at 11:16

## 2019-01-01 RX ADMIN — IPRATROPIUM BROMIDE AND ALBUTEROL SULFATE 3 ML: 2.5; .5 SOLUTION RESPIRATORY (INHALATION) at 07:12

## 2019-01-01 RX ADMIN — LORAZEPAM 0.5 MG: 2 INJECTION INTRAMUSCULAR; INTRAVENOUS at 23:54

## 2019-01-01 RX ADMIN — IPRATROPIUM BROMIDE AND ALBUTEROL SULFATE 3 ML: 2.5; .5 SOLUTION RESPIRATORY (INHALATION) at 00:13

## 2019-01-01 RX ADMIN — METHYLPREDNISOLONE SODIUM SUCCINATE 40 MG: 40 INJECTION, POWDER, LYOPHILIZED, FOR SOLUTION INTRAMUSCULAR; INTRAVENOUS at 14:30

## 2019-01-01 RX ADMIN — IPRATROPIUM BROMIDE AND ALBUTEROL SULFATE 3 ML: 2.5; .5 SOLUTION RESPIRATORY (INHALATION) at 21:06

## 2019-01-01 RX ADMIN — HYDROMORPHONE HYDROCHLORIDE 1 MG: 1 INJECTION, SOLUTION INTRAMUSCULAR; INTRAVENOUS; SUBCUTANEOUS at 00:38

## 2019-01-01 RX ADMIN — METHYLPREDNISOLONE SODIUM SUCCINATE 40 MG: 40 INJECTION, POWDER, FOR SOLUTION INTRAMUSCULAR; INTRAVENOUS at 18:19

## 2019-01-01 RX ADMIN — SODIUM CHLORIDE, PRESERVATIVE FREE 3 ML: 5 INJECTION INTRAVENOUS at 08:26

## 2019-01-01 RX ADMIN — ENOXAPARIN SODIUM 30 MG: 30 INJECTION SUBCUTANEOUS at 08:45

## 2019-01-01 RX ADMIN — METHYLPREDNISOLONE SODIUM SUCCINATE 40 MG: 40 INJECTION, POWDER, FOR SOLUTION INTRAMUSCULAR; INTRAVENOUS at 05:51

## 2019-01-01 RX ADMIN — IPRATROPIUM BROMIDE AND ALBUTEROL SULFATE 3 ML: 2.5; .5 SOLUTION RESPIRATORY (INHALATION) at 21:46

## 2019-01-01 RX ADMIN — METHYLPREDNISOLONE SODIUM SUCCINATE 40 MG: 40 INJECTION, POWDER, LYOPHILIZED, FOR SOLUTION INTRAMUSCULAR; INTRAVENOUS at 22:30

## 2019-01-01 RX ADMIN — SODIUM CHLORIDE 75 ML/HR: 9 INJECTION, SOLUTION INTRAVENOUS at 14:40

## 2019-01-01 RX ADMIN — INSULIN LISPRO 2 UNITS: 100 INJECTION, SOLUTION INTRAVENOUS; SUBCUTANEOUS at 02:16

## 2019-01-01 RX ADMIN — MORPHINE SULFATE 2 MG: 10 INJECTION INTRAVENOUS at 12:08

## 2019-01-01 RX ADMIN — IPRATROPIUM BROMIDE AND ALBUTEROL SULFATE 3 ML: 2.5; .5 SOLUTION RESPIRATORY (INHALATION) at 04:55

## 2019-01-01 RX ADMIN — IPRATROPIUM BROMIDE AND ALBUTEROL SULFATE 3 ML: 2.5; .5 SOLUTION RESPIRATORY (INHALATION) at 15:18

## 2019-01-01 RX ADMIN — METHYLPREDNISOLONE SODIUM SUCCINATE 40 MG: 40 INJECTION, POWDER, LYOPHILIZED, FOR SOLUTION INTRAMUSCULAR; INTRAVENOUS at 05:35

## 2019-01-01 RX ADMIN — MORPHINE SULFATE 4 MG: 10 INJECTION INTRAVENOUS at 16:59

## 2019-01-01 RX ADMIN — MORPHINE SULFATE 4 MG: 10 INJECTION INTRAVENOUS at 21:09

## 2019-01-01 RX ADMIN — HYDRALAZINE HYDROCHLORIDE 20 MG: 20 INJECTION INTRAMUSCULAR; INTRAVENOUS at 10:24

## 2019-01-01 RX ADMIN — SENNOSIDES AND DOCUSATE SODIUM 2 TABLET: 8.6; 5 TABLET ORAL at 23:41

## 2019-01-01 RX ADMIN — IPRATROPIUM BROMIDE AND ALBUTEROL SULFATE 3 ML: 2.5; .5 SOLUTION RESPIRATORY (INHALATION) at 03:14

## 2019-01-01 RX ADMIN — IPRATROPIUM BROMIDE AND ALBUTEROL SULFATE 3 ML: 2.5; .5 SOLUTION RESPIRATORY (INHALATION) at 04:20

## 2019-01-01 RX ADMIN — METHYLPREDNISOLONE SODIUM SUCCINATE 40 MG: 40 INJECTION, POWDER, LYOPHILIZED, FOR SOLUTION INTRAMUSCULAR; INTRAVENOUS at 05:44

## 2019-01-01 RX ADMIN — SODIUM CHLORIDE 75 ML/HR: 9 INJECTION, SOLUTION INTRAVENOUS at 23:43

## 2019-01-01 RX ADMIN — METHYLPREDNISOLONE SODIUM SUCCINATE 40 MG: 40 INJECTION, POWDER, LYOPHILIZED, FOR SOLUTION INTRAMUSCULAR; INTRAVENOUS at 02:17

## 2019-01-01 RX ADMIN — IPRATROPIUM BROMIDE AND ALBUTEROL SULFATE 3 ML: 2.5; .5 SOLUTION RESPIRATORY (INHALATION) at 11:38

## 2019-01-01 RX ADMIN — IPRATROPIUM BROMIDE AND ALBUTEROL SULFATE 3 ML: 2.5; .5 SOLUTION RESPIRATORY (INHALATION) at 16:17

## 2019-01-01 RX ADMIN — HYDROMORPHONE HYDROCHLORIDE 1 MG: 1 INJECTION, SOLUTION INTRAMUSCULAR; INTRAVENOUS; SUBCUTANEOUS at 04:50
